# Patient Record
Sex: FEMALE | Race: WHITE | NOT HISPANIC OR LATINO | Employment: OTHER
[De-identification: names, ages, dates, MRNs, and addresses within clinical notes are randomized per-mention and may not be internally consistent; named-entity substitution may affect disease eponyms.]

---

## 2017-06-03 ENCOUNTER — HEALTH MAINTENANCE LETTER (OUTPATIENT)
Age: 57
End: 2017-06-03

## 2019-09-29 ENCOUNTER — HEALTH MAINTENANCE LETTER (OUTPATIENT)
Age: 59
End: 2019-09-29

## 2020-11-13 ENCOUNTER — TELEPHONE (OUTPATIENT)
Dept: OBGYN | Facility: CLINIC | Age: 60
End: 2020-11-13

## 2020-11-13 NOTE — TELEPHONE ENCOUNTER
Pt calling for uro gyn who ifunctions at the clinic Dr. Escalera and Dr. Dorado.  She will check with her insurance and call back to [phone] to joel an appt

## 2021-01-14 ENCOUNTER — HEALTH MAINTENANCE LETTER (OUTPATIENT)
Age: 61
End: 2021-01-14

## 2021-01-14 ENCOUNTER — HOSPITAL ENCOUNTER (OUTPATIENT)
Facility: CLINIC | Age: 61
End: 2021-01-14
Attending: OBSTETRICS & GYNECOLOGY | Admitting: OBSTETRICS & GYNECOLOGY
Payer: COMMERCIAL

## 2021-03-01 ENCOUNTER — DOCUMENTATION ONLY (OUTPATIENT)
Dept: CARE COORDINATION | Facility: CLINIC | Age: 61
End: 2021-03-01

## 2021-03-28 ASSESSMENT — ENCOUNTER SYMPTOMS
POLYDIPSIA: 0
EYE IRRITATION: 0
FEVER: 0
WEIGHT GAIN: 1
STIFFNESS: 1
NECK PAIN: 0
INCREASED ENERGY: 0
FATIGUE: 0
JOINT SWELLING: 1
BACK PAIN: 0
MYALGIAS: 1
NIGHT SWEATS: 0
ARTHRALGIAS: 1
ALTERED TEMPERATURE REGULATION: 0
DOUBLE VISION: 0
EYE PAIN: 0
MUSCLE CRAMPS: 0
HALLUCINATIONS: 0
WEIGHT LOSS: 0
CHILLS: 0
EYE WATERING: 0
EYE REDNESS: 1
POLYPHAGIA: 0
MUSCLE WEAKNESS: 0
DECREASED APPETITE: 0

## 2021-03-31 ENCOUNTER — OFFICE VISIT (OUTPATIENT)
Dept: INTERNAL MEDICINE | Facility: CLINIC | Age: 61
End: 2021-03-31
Payer: COMMERCIAL

## 2021-03-31 VITALS
WEIGHT: 198 LBS | OXYGEN SATURATION: 100 % | DIASTOLIC BLOOD PRESSURE: 81 MMHG | HEART RATE: 66 BPM | BODY MASS INDEX: 31.01 KG/M2 | SYSTOLIC BLOOD PRESSURE: 128 MMHG

## 2021-03-31 DIAGNOSIS — Z86.0101 HX OF ADENOMATOUS COLONIC POLYPS: Primary | ICD-10-CM

## 2021-03-31 DIAGNOSIS — H91.90 DECREASED HEARING, UNSPECIFIED LATERALITY: ICD-10-CM

## 2021-03-31 DIAGNOSIS — Z12.31 ENCOUNTER FOR SCREENING MAMMOGRAM FOR BREAST CANCER: ICD-10-CM

## 2021-03-31 DIAGNOSIS — Z86.2 HISTORY OF ANEMIA: ICD-10-CM

## 2021-03-31 DIAGNOSIS — Z00.00 ROUTINE HISTORY AND PHYSICAL EXAMINATION OF ADULT: ICD-10-CM

## 2021-03-31 DIAGNOSIS — Z78.0 MENOPAUSE: ICD-10-CM

## 2021-03-31 DIAGNOSIS — G25.81 RESTLESS LEG SYNDROME: ICD-10-CM

## 2021-03-31 DIAGNOSIS — B00.1 COLD SORE: ICD-10-CM

## 2021-03-31 LAB
ALBUMIN SERPL-MCNC: 4 G/DL (ref 3.4–5)
ALP SERPL-CCNC: 105 U/L (ref 40–150)
ALT SERPL W P-5'-P-CCNC: 27 U/L (ref 0–50)
ANION GAP SERPL CALCULATED.3IONS-SCNC: 2 MMOL/L (ref 3–14)
AST SERPL W P-5'-P-CCNC: 23 U/L (ref 0–45)
BASOPHILS # BLD AUTO: 0 10E9/L (ref 0–0.2)
BASOPHILS NFR BLD AUTO: 0.7 %
BILIRUB SERPL-MCNC: 0.3 MG/DL (ref 0.2–1.3)
BUN SERPL-MCNC: 11 MG/DL (ref 7–30)
CALCIUM SERPL-MCNC: 9.3 MG/DL (ref 8.5–10.1)
CHLORIDE SERPL-SCNC: 108 MMOL/L (ref 94–109)
CHOLEST SERPL-MCNC: 192 MG/DL
CO2 SERPL-SCNC: 31 MMOL/L (ref 20–32)
CREAT SERPL-MCNC: 0.95 MG/DL (ref 0.52–1.04)
DEPRECATED CALCIDIOL+CALCIFEROL SERPL-MC: 36 UG/L (ref 20–75)
DIFFERENTIAL METHOD BLD: NORMAL
EOSINOPHIL # BLD AUTO: 0.3 10E9/L (ref 0–0.7)
EOSINOPHIL NFR BLD AUTO: 4.5 %
ERYTHROCYTE [DISTWIDTH] IN BLOOD BY AUTOMATED COUNT: 12.3 % (ref 10–15)
FERRITIN SERPL-MCNC: 33 NG/ML (ref 8–252)
GFR SERPL CREATININE-BSD FRML MDRD: 65 ML/MIN/{1.73_M2}
GLUCOSE SERPL-MCNC: 88 MG/DL (ref 70–99)
HCT VFR BLD AUTO: 43.1 % (ref 35–47)
HDLC SERPL-MCNC: 73 MG/DL
HGB BLD-MCNC: 14 G/DL (ref 11.7–15.7)
IMM GRANULOCYTES # BLD: 0 10E9/L (ref 0–0.4)
IMM GRANULOCYTES NFR BLD: 0 %
IRON SATN MFR SERPL: 12 % (ref 15–46)
IRON SERPL-MCNC: 38 UG/DL (ref 35–180)
LDLC SERPL CALC-MCNC: 105 MG/DL
LYMPHOCYTES # BLD AUTO: 2.6 10E9/L (ref 0.8–5.3)
LYMPHOCYTES NFR BLD AUTO: 47.5 %
MCH RBC QN AUTO: 31.9 PG (ref 26.5–33)
MCHC RBC AUTO-ENTMCNC: 32.5 G/DL (ref 31.5–36.5)
MCV RBC AUTO: 98 FL (ref 78–100)
MONOCYTES # BLD AUTO: 0.4 10E9/L (ref 0–1.3)
MONOCYTES NFR BLD AUTO: 7.3 %
NEUTROPHILS # BLD AUTO: 2.2 10E9/L (ref 1.6–8.3)
NEUTROPHILS NFR BLD AUTO: 40 %
NONHDLC SERPL-MCNC: 120 MG/DL
NRBC # BLD AUTO: 0 10*3/UL
NRBC BLD AUTO-RTO: 0 /100
PLATELET # BLD AUTO: 240 10E9/L (ref 150–450)
POTASSIUM SERPL-SCNC: 3.8 MMOL/L (ref 3.4–5.3)
PROT SERPL-MCNC: 7 G/DL (ref 6.8–8.8)
RBC # BLD AUTO: 4.39 10E12/L (ref 3.8–5.2)
SODIUM SERPL-SCNC: 141 MMOL/L (ref 133–144)
TIBC SERPL-MCNC: 323 UG/DL (ref 240–430)
TRIGL SERPL-MCNC: 75 MG/DL
TSH SERPL DL<=0.005 MIU/L-ACNC: 3.8 MU/L (ref 0.4–4)
WBC # BLD AUTO: 5.5 10E9/L (ref 4–11)

## 2021-03-31 PROCEDURE — 83540 ASSAY OF IRON: CPT | Performed by: PATHOLOGY

## 2021-03-31 PROCEDURE — 99000 SPECIMEN HANDLING OFFICE-LAB: CPT | Performed by: PATHOLOGY

## 2021-03-31 PROCEDURE — 82306 VITAMIN D 25 HYDROXY: CPT | Mod: 90 | Performed by: PATHOLOGY

## 2021-03-31 PROCEDURE — 82728 ASSAY OF FERRITIN: CPT | Performed by: PATHOLOGY

## 2021-03-31 PROCEDURE — 99386 PREV VISIT NEW AGE 40-64: CPT | Performed by: INTERNAL MEDICINE

## 2021-03-31 PROCEDURE — 83550 IRON BINDING TEST: CPT | Performed by: PATHOLOGY

## 2021-03-31 PROCEDURE — 80061 LIPID PANEL: CPT | Performed by: PATHOLOGY

## 2021-03-31 PROCEDURE — 80050 GENERAL HEALTH PANEL: CPT | Performed by: PATHOLOGY

## 2021-03-31 PROCEDURE — 36415 COLL VENOUS BLD VENIPUNCTURE: CPT | Performed by: PATHOLOGY

## 2021-03-31 RX ORDER — VITAMIN B COMPLEX
TABLET ORAL DAILY
COMMUNITY
End: 2021-10-07

## 2021-03-31 RX ORDER — VALACYCLOVIR HYDROCHLORIDE 1 G/1
2000 TABLET, FILM COATED ORAL 2 TIMES DAILY
Qty: 4 TABLET | Refills: 4 | Status: SHIPPED | OUTPATIENT
Start: 2021-03-31 | End: 2021-10-19

## 2021-03-31 RX ORDER — GABAPENTIN 300 MG/1
300 CAPSULE ORAL AT BEDTIME
Qty: 90 CAPSULE | Refills: 3 | Status: SHIPPED | OUTPATIENT
Start: 2021-03-31 | End: 2021-10-07

## 2021-03-31 RX ORDER — CHLORAL HYDRATE 500 MG
2 CAPSULE ORAL DAILY
COMMUNITY
End: 2021-10-07

## 2021-03-31 NOTE — PROGRESS NOTES
Jessie is a very pleasant 61 year old with a PMH of uterine prolapse and knee meniscal repair who presents to establish primary care and routine physical.  She is generally quite healthy but hasn't been seen since 2018 and so is due for a few screening tests.  Also reports restless legs and an occasional cold sore outbreak, used to see Derm for a valtrex prescription.  Also notes reduced hearing (her family comments on it) with some occasional tinnitus.    Current Outpatient Medications:      albuterol (PROAIR HFA, PROVENTIL HFA, VENTOLIN HFA) 108 (90 BASE) MCG/ACT inhaler, Inhale 2 puffs into the lungs every 6 hours as needed for shortness of breath / dyspnea or wheezing, Disp: 1 Inhaler, Rfl: 1     GLUCOSAMINE SULFATE PO, Take 500 mg by mouth 3 times daily, Disp: , Rfl:      Ibuprofen (ADVIL PO), Take 600 mg by mouth as needed for moderate pain, Disp: , Rfl:      methylPREDNISolone (MEDROL DOSEPAK) 4 MG tablet, Follow package instructions, Disp: 21 tablet, Rfl: 0     Naproxen Sodium (ALEVE PO), , Disp: , Rfl:     No changes to past, family, or social history and  ROS: 10 point ROS neg other than the symptoms noted above in the HPI.    PHYSICAL EXAM:  /81   Pulse 66   Wt 89.8 kg (198 lb)   SpO2 100%   Breastfeeding No   BMI 31.01 kg/m    Constitutional: no distress, comfortable, pleasant   Eyes: anicteric, normal extra-ocular movements   Ears, Nose and Throat: tympanic membranes clear, neck supple with full range of motion, no thyromegaly.   Cardiovascular: regular rate and rhythm, normal S1 and S2, no murmurs, rubs or gallops, peripheral pulses full and symmetric   Respiratory: clear to auscultation, no wheezes or crackles, normal breath sounds   Gastrointestinal: positive bowel sounds, nontender, no hepatosplenomegaly, no masses   Musculoskeletal: right knee full range of motion, no effusion; left has brace in place  Skin: no concerning lesions, no jaundice   Neurological: normal gait, no tremor    Psychological: appropriate mood   Lymphatic: no cervical lymphadenopathy and no pedal edema    A/P Sonja was seen today for establish care and physical    Hx of adenomatous colonic polyps  -     GASTROENTEROLOGY ADULT REF PROCEDURE ONLY; Future    Routine history and physical examination of adult  -     Lipid Profile NON-FASTING; Future  -     Comprehensive metabolic panel; Future  -     TSH with free T4 reflex; Future  -     Vitamin D Deficiency; Future    History of anemia  -     Ferritin; Future  -     Iron and iron binding capacity; Future  -     CBC with platelets differential; Future    Restless leg syndrome  -     gabapentin (NEURONTIN) 300 MG capsule; Take 1 capsule (300 mg) by mouth At Bedtime    Menopause  -     Dexa hip/pelvis/spine*; Future    Cold sore  -     valACYclovir (VALTREX) 1000 mg tablet; Take 2 tablets (2,000 mg) by mouth 2 times daily for 5 days    Encounter for screening mammogram for breast cancer  -     Mammogram, screening w landry (3D); Future    Decreased hearing, unspecified laterality  -     AUDIOLOGY ADULT REFERRAL; Future    RTC in one year or sooner prn.  Danyell Pearson MD

## 2021-03-31 NOTE — NURSING NOTE
Chief Complaint   Patient presents with     Establish Care     Kimberly Nissen, REMY at 12:33 PM on 3/31/2021

## 2021-03-31 NOTE — PATIENT INSTRUCTIONS
Radiology:  [phone] [facility]  [phone] [facility]  [phone] [facility]    To schedule a mammogram:  [facility] [phone]   [facility] [phone]   [facility] [phone]

## 2021-04-01 ENCOUNTER — TELEPHONE (OUTPATIENT)
Dept: AUDIOLOGY | Facility: CLINIC | Age: 61
End: 2021-04-01

## 2021-04-01 NOTE — TELEPHONE ENCOUNTER
LVM for patient to schedule HEV with next available audiologist per referral from Dr. Martín Pearson.    Left call center number for scheduling.

## 2021-09-17 ENCOUNTER — TELEPHONE (OUTPATIENT)
Dept: GASTROENTEROLOGY | Facility: CLINIC | Age: 61
End: 2021-09-17

## 2021-09-17 NOTE — TELEPHONE ENCOUNTER
Screening Questions  1. Are you active on mychart? YES    2. What insurance is in the chart? [employer]    2.  Ordering/Referring Provider: Danyell Bateman MD    3. BMI 30.7    4. Are you on daily home oxygen? NO    5. Do you have a history of difficult airway? NO    6. Have you had a heart, lung, or liver transplant? NO    7. Are you currently on dialysis or have chronic kidney disease? NO    8. Have you had a stroke or Transient ischemic atttack (TIA) within 6 months? NO    9. In the past 6 months, have you had any heart related issues including cardiomyopathy or heart attack?         If yes, did it require cardiac stenting or other implantable device?NO    10. Do you have any implantable devices in your body (pacemaker, defib, LVAD)? NO    11. Do you take nitroglycerin? If yes, how often? NO    12. Are you currently taking any blood thinners?NO    13. Are you a diabetic? NO    14. (Females) Are you currently pregnant? NO  If yes, how many weeks?    15. Have you had a procedure in the past that was difficult to tolerate with conscious sedation? Any allergies to Fentanyl or Versed NO    16. Are you taking any scheduled prescription narcotics more than once daily? NO    17. Do you have any chemical dependencies such as alcohol, street drugs, or methadone? NO    18. Do you have any history of post-traumatic stress syndrome or mental health issues? NO    19. Do you transfer independently? YES    20.  Do you have any issues with constipation? NO    21. Preferred Pharmacy for Pre Prescription [facility]     Scheduling Details    Which Colonoscopy Prep was Sent?: MIRALAX  Procedure Scheduled: COLONOSCOPY   Provider/Surgeon: Dr. Morales  Date of Procedure: 09/24/2021  Location: [facility]  Caller (Please ask for phone number if not scheduled by patient): DANIELLE      Sedation Type: CS  Conscious Sedation- Needs  for 6 hours after the procedure  MAC/General-Needs  for 24 hours after procedure    Pre-op  Required at [facility] and OR for MAC sedation:   (if yes advise patient they will need a pre-op prior to procedure)      Is patient on blood thinners? -NO  (If yes- inform patient to follow up with PCP or provider for follow up instructions)     Informed patient they will need an adult  YES  Cannot take any type of public or medical transportation alone    Pre-Procedure [facility] or Externally: YES    Confirmed Nurse will call to complete assessment YES    Additional comments: NO

## 2021-09-17 NOTE — TELEPHONE ENCOUNTER
Pre assessment questions completed for upcoming colonoscopy procedure scheduled on 9/24/21    COVID test scheduled? Patient to call to schedule. Patient is aware that test is required within 4 days of procedure.     Procedural arrival time and facility location reviewed.    Designated  policy reviewed. Instructed to have someone stay 6 hours post procedure.     Bowel prep recommendation: Miralax/Magnesium citrate/Dulcolax     Reviewed Miralax prep instructions with patient. No fiber/iron supplements or foods that contain nuts/seeds 7 days prior to procedure.     Anticoagulation/blood thinners? no    Electronic implanted devices? no    Patient verbalized understanding and had no questions or concerns at this time.    Valentina Figueroa RN

## 2021-09-19 DIAGNOSIS — Z11.59 ENCOUNTER FOR SCREENING FOR OTHER VIRAL DISEASES: ICD-10-CM

## 2021-09-20 ENCOUNTER — TELEPHONE (OUTPATIENT)
Dept: GASTROENTEROLOGY | Facility: OUTPATIENT CENTER | Age: 61
End: 2021-09-20

## 2021-09-20 DIAGNOSIS — Z11.59 ENCOUNTER FOR SCREENING FOR OTHER VIRAL DISEASES: ICD-10-CM

## 2021-09-20 NOTE — TELEPHONE ENCOUNTER
Caller: SELF    Procedure: COLONOSCOPY    Date of Procedure Cancelled: 9-24    Ordering Provider: PCP    Reason for cancel: PATIENT PREFERS A GI DOC TO PERFORM PROCEDURE ONLY    Any Staff messages sent regarding case?: NO                     Rescheduled: YES     If rescheduled:    Date: 9-28   Location: [facility]   Note any change or update to original order/sedation: DIFFERENT DOC, DUE TO PATIENT PREFERENCE

## 2021-09-24 ENCOUNTER — LAB (OUTPATIENT)
Dept: URGENT CARE | Facility: URGENT CARE | Age: 61
End: 2021-09-24
Payer: COMMERCIAL

## 2021-09-24 DIAGNOSIS — Z11.59 ENCOUNTER FOR SCREENING FOR OTHER VIRAL DISEASES: ICD-10-CM

## 2021-09-24 PROCEDURE — U0005 INFEC AGEN DETEC AMPLI PROBE: HCPCS

## 2021-09-24 PROCEDURE — U0003 INFECTIOUS AGENT DETECTION BY NUCLEIC ACID (DNA OR RNA); SEVERE ACUTE RESPIRATORY SYNDROME CORONAVIRUS 2 (SARS-COV-2) (CORONAVIRUS DISEASE [COVID-19]), AMPLIFIED PROBE TECHNIQUE, MAKING USE OF HIGH THROUGHPUT TECHNOLOGIES AS DESCRIBED BY CMS-2020-01-R: HCPCS

## 2021-09-24 PROCEDURE — 99207 PR NO CHARGE LOS: CPT

## 2021-09-25 LAB — SARS-COV-2 RNA RESP QL NAA+PROBE: NEGATIVE

## 2021-09-28 ENCOUNTER — HOSPITAL ENCOUNTER (OUTPATIENT)
Facility: CLINIC | Age: 61
Discharge: HOME | End: 2021-09-28
Attending: INTERNAL MEDICINE | Admitting: INTERNAL MEDICINE
Payer: COMMERCIAL

## 2021-09-28 VITALS
WEIGHT: 202.16 LBS | RESPIRATION RATE: 16 BRPM | HEIGHT: 66 IN | DIASTOLIC BLOOD PRESSURE: 80 MMHG | BODY MASS INDEX: 32.49 KG/M2 | SYSTOLIC BLOOD PRESSURE: 127 MMHG | HEART RATE: 69 BPM | TEMPERATURE: 98.3 F | OXYGEN SATURATION: 98 %

## 2021-09-28 DIAGNOSIS — Z11.59 ENCOUNTER FOR SCREENING FOR OTHER VIRAL DISEASES: ICD-10-CM

## 2021-09-28 LAB — COLONOSCOPY: NORMAL

## 2021-09-28 PROCEDURE — 88305 TISSUE EXAM BY PATHOLOGIST: CPT | Mod: TC | Performed by: INTERNAL MEDICINE

## 2021-09-28 PROCEDURE — G0500 MOD SEDAT ENDO SERVICE >5YRS: HCPCS | Performed by: INTERNAL MEDICINE

## 2021-09-28 PROCEDURE — 88305 TISSUE EXAM BY PATHOLOGIST: CPT | Mod: 26 | Performed by: PATHOLOGY

## 2021-09-28 PROCEDURE — 250N000011 HC RX IP 250 OP 636: Performed by: INTERNAL MEDICINE

## 2021-09-28 PROCEDURE — 250N000013 HC RX MED GY IP 250 OP 250 PS 637: Performed by: INTERNAL MEDICINE

## 2021-09-28 PROCEDURE — 45385 COLONOSCOPY W/LESION REMOVAL: CPT | Performed by: INTERNAL MEDICINE

## 2021-09-28 RX ORDER — NALOXONE HYDROCHLORIDE 0.4 MG/ML
0.2 INJECTION, SOLUTION INTRAMUSCULAR; INTRAVENOUS; SUBCUTANEOUS
Status: DISCONTINUED | OUTPATIENT
Start: 2021-09-28 | End: 2021-09-28 | Stop reason: HOSPADM

## 2021-09-28 RX ORDER — ONDANSETRON 2 MG/ML
4 INJECTION INTRAMUSCULAR; INTRAVENOUS
Status: DISCONTINUED | OUTPATIENT
Start: 2021-09-28 | End: 2021-09-28 | Stop reason: HOSPADM

## 2021-09-28 RX ORDER — ONDANSETRON 2 MG/ML
4 INJECTION INTRAMUSCULAR; INTRAVENOUS EVERY 6 HOURS PRN
Status: DISCONTINUED | OUTPATIENT
Start: 2021-09-28 | End: 2021-09-28 | Stop reason: HOSPADM

## 2021-09-28 RX ORDER — LIDOCAINE 40 MG/G
CREAM TOPICAL
Status: DISCONTINUED | OUTPATIENT
Start: 2021-09-28 | End: 2021-09-28 | Stop reason: HOSPADM

## 2021-09-28 RX ORDER — ONDANSETRON 4 MG/1
4 TABLET, ORALLY DISINTEGRATING ORAL EVERY 6 HOURS PRN
Status: DISCONTINUED | OUTPATIENT
Start: 2021-09-28 | End: 2021-09-28 | Stop reason: HOSPADM

## 2021-09-28 RX ORDER — NALOXONE HYDROCHLORIDE 0.4 MG/ML
0.4 INJECTION, SOLUTION INTRAMUSCULAR; INTRAVENOUS; SUBCUTANEOUS
Status: DISCONTINUED | OUTPATIENT
Start: 2021-09-28 | End: 2021-09-28 | Stop reason: HOSPADM

## 2021-09-28 RX ORDER — FENTANYL CITRATE 50 UG/ML
INJECTION, SOLUTION INTRAMUSCULAR; INTRAVENOUS PRN
Status: COMPLETED | OUTPATIENT
Start: 2021-09-28 | End: 2021-09-28

## 2021-09-28 RX ORDER — VALACYCLOVIR HYDROCHLORIDE 1 G/1
2000 TABLET, FILM COATED ORAL
COMMUNITY
Start: 2021-03-31 | End: 2021-10-07

## 2021-09-28 RX ORDER — PROCHLORPERAZINE MALEATE 10 MG
10 TABLET ORAL EVERY 6 HOURS PRN
Status: DISCONTINUED | OUTPATIENT
Start: 2021-09-28 | End: 2021-09-28 | Stop reason: HOSPADM

## 2021-09-28 RX ORDER — FLUMAZENIL 0.1 MG/ML
0.2 INJECTION, SOLUTION INTRAVENOUS
Status: DISCONTINUED | OUTPATIENT
Start: 2021-09-28 | End: 2021-09-28 | Stop reason: HOSPADM

## 2021-09-28 RX ORDER — CELECOXIB 200 MG/1
CAPSULE ORAL
COMMUNITY
Start: 2021-09-16 | End: 2021-10-07

## 2021-09-28 RX ORDER — SIMETHICONE 40MG/0.6ML
SUSPENSION, DROPS(FINAL DOSAGE FORM)(ML) ORAL PRN
Status: COMPLETED | OUTPATIENT
Start: 2021-09-28 | End: 2021-09-28

## 2021-09-28 RX ORDER — DIPHENHYDRAMINE HYDROCHLORIDE 50 MG/ML
INJECTION INTRAMUSCULAR; INTRAVENOUS PRN
Status: COMPLETED | OUTPATIENT
Start: 2021-09-28 | End: 2021-09-28

## 2021-09-28 RX ADMIN — DIPHENHYDRAMINE HYDROCHLORIDE 25 MG: 50 INJECTION, SOLUTION INTRAMUSCULAR; INTRAVENOUS at 09:30

## 2021-09-28 RX ADMIN — DIPHENHYDRAMINE HYDROCHLORIDE 25 MG: 50 INJECTION, SOLUTION INTRAMUSCULAR; INTRAVENOUS at 09:27

## 2021-09-28 RX ADMIN — FENTANYL CITRATE 50 MCG: 50 INJECTION, SOLUTION INTRAMUSCULAR; INTRAVENOUS at 09:27

## 2021-09-28 RX ADMIN — SIMETHICONE 133 MG: 63.3; 3.7 SOLUTION/ DROPS ORAL at 09:22

## 2021-09-28 RX ADMIN — MIDAZOLAM 1 MG: 1 INJECTION INTRAMUSCULAR; INTRAVENOUS at 09:32

## 2021-09-28 RX ADMIN — FENTANYL CITRATE 100 MCG: 50 INJECTION, SOLUTION INTRAMUSCULAR; INTRAVENOUS at 09:24

## 2021-09-28 RX ADMIN — MIDAZOLAM 2 MG: 1 INJECTION INTRAMUSCULAR; INTRAVENOUS at 09:23

## 2021-09-28 RX ADMIN — MIDAZOLAM 1 MG: 1 INJECTION INTRAMUSCULAR; INTRAVENOUS at 09:26

## 2021-09-28 ASSESSMENT — MIFFLIN-ST. JEOR: SCORE: 1508.75

## 2021-09-28 NOTE — H&P
Sojna Morfin  [id number]  female  61 year old      Reason for procedure/surgery: colon polyp surveillance    There is no problem list on file for this patient.      Past Surgical History:    Past Surgical History:   Procedure Laterality Date     TONSILLECTOMY         Past Medical History:   Past Medical History:   Diagnosis Date     NO ACTIVE PROBLEMS        Social History:   Social History     Tobacco Use     Smoking status: Never Smoker   Substance Use Topics     Alcohol use: Not on file       Family History:   Family History   Problem Relation Age of Onset     Hyperlipidemia Mother      Hyperlipidemia Father      Cerebrovascular Disease Paternal Grandfather        Allergies: No Known Allergies    Active Medications:   No current outpatient medications on file.       Systemic Review:   CONSTITUTIONAL: NEGATIVE for fever, chills, change in weight  ENT/MOUTH: NEGATIVE for ear, mouth and throat problems  RESP: NEGATIVE for significant cough or SOB  CV: NEGATIVE for chest pain, palpitations or peripheral edema    Physical Examination:   Vital Signs: There were no vitals taken for this visit.  GENERAL: healthy, alert and no distress  NECK: no adenopathy, no asymmetry, masses, or scars  RESP: lungs clear to auscultation - no rales, rhonchi or wheezes  CV: regular rate and rhythm, normal S1 S2, no S3 or S4, no murmur, click or rub, no peripheral edema and peripheral pulses strong  ABDOMEN: soft, nontender, no hepatosplenomegaly, no masses and bowel sounds normal  MS: no gross musculoskeletal defects noted, no edema    Plan: Appropriate to proceed as scheduled.      Mario Wilkes MD  9/28/2021    PCP:  Danyell Bateman

## 2021-09-28 NOTE — DISCHARGE INSTRUCTIONS
Discharge Instructions after Colonoscopy with polypectomy by Dr Wilkes    Activity and Diet  You were given medicine for pain. You may be dizzy or sleepy.  For 24 hours:    Do not drive or use heavy equipment.    Do not make important decisions.    Do not drink any alcohol.  You may return to your normal diet and medicines.    Discomfort    Air was placed in your colon during the exam in order to see it. Walking helps to pass the air.    You may take Tylenol (acetaminophen) for pain unless your doctor has told you not to.    Follow-up  _X___ We took small polyps to study. Your doctor will send you the results  within two weeks.    When to call:    Call right away if you have:    Unusual pain in belly or chest pain not relieved with passing air.    More than 1 to 2 Tablespoons of bleeding from your rectum.    Fever above 100.6  F (37.5  C).    If you have severe pain, bleeding, or shortness of breath, go to an emergency room.    If you have questions, call:  Monday to Friday, 8 a.m. to 4:30 p.m.  Central Scheduling Department: [phone]    After hours  Hospital: [phone] (Ask for the GI fellow on call)

## 2021-09-28 NOTE — OR NURSING
Procedure: Colonoscopy with polypectomy x2 with cold snare  Sedation: Conscious sedation   Specimens: x1 jar to path  O2: 2L NC    Patient tolerated procedure well. Patient stable on transfer to .

## 2021-09-29 LAB
PATH REPORT.COMMENTS IMP SPEC: NORMAL
PATH REPORT.COMMENTS IMP SPEC: NORMAL
PATH REPORT.FINAL DX SPEC: NORMAL
PATH REPORT.GROSS SPEC: NORMAL
PATH REPORT.MICROSCOPIC SPEC OTHER STN: NORMAL
PATH REPORT.RELEVANT HX SPEC: NORMAL
PHOTO IMAGE: NORMAL

## 2021-10-07 ENCOUNTER — OFFICE VISIT (OUTPATIENT)
Dept: FAMILY MEDICINE | Facility: CLINIC | Age: 61
End: 2021-10-07
Payer: COMMERCIAL

## 2021-10-07 VITALS
WEIGHT: 199 LBS | BODY MASS INDEX: 32.12 KG/M2 | DIASTOLIC BLOOD PRESSURE: 78 MMHG | HEART RATE: 61 BPM | TEMPERATURE: 97.5 F | OXYGEN SATURATION: 98 % | SYSTOLIC BLOOD PRESSURE: 122 MMHG

## 2021-10-07 DIAGNOSIS — N81.4 UTERINE PROLAPSE: ICD-10-CM

## 2021-10-07 DIAGNOSIS — N39.3 STRESS INCONTINENCE IN FEMALE: ICD-10-CM

## 2021-10-07 DIAGNOSIS — R00.1 SINUS BRADYCARDIA: ICD-10-CM

## 2021-10-07 DIAGNOSIS — Z11.4 SCREENING FOR HIV (HUMAN IMMUNODEFICIENCY VIRUS): ICD-10-CM

## 2021-10-07 DIAGNOSIS — Z11.59 NEED FOR HEPATITIS C SCREENING TEST: ICD-10-CM

## 2021-10-07 DIAGNOSIS — Z01.818 PREOPERATIVE CLEARANCE: Primary | ICD-10-CM

## 2021-10-07 PROBLEM — M54.6 PAIN IN THORACIC SPINE: Status: ACTIVE | Noted: 2021-10-07

## 2021-10-07 PROBLEM — F43.29 ADJUSTMENT DISORDER WITH MIXED EMOTIONAL FEATURES: Status: ACTIVE | Noted: 2021-10-07

## 2021-10-07 PROBLEM — R07.89 ATYPICAL CHEST PAIN: Status: ACTIVE | Noted: 2021-10-07

## 2021-10-07 PROBLEM — S83.272A COMPLEX TEAR OF LATERAL MENISCUS OF LEFT KNEE AS CURRENT INJURY: Status: ACTIVE | Noted: 2021-01-18

## 2021-10-07 PROBLEM — L57.0 ACTINIC KERATOSIS: Status: ACTIVE | Noted: 2021-10-07

## 2021-10-07 PROBLEM — Z78.0 MENOPAUSE PRESENT: Status: ACTIVE | Noted: 2018-12-24

## 2021-10-07 PROBLEM — M17.12 PRIMARY OSTEOARTHRITIS OF LEFT KNEE: Status: ACTIVE | Noted: 2021-01-18

## 2021-10-07 PROBLEM — B00.1 HERPES SIMPLEX LABIALIS: Status: ACTIVE | Noted: 2021-10-07

## 2021-10-07 PROBLEM — G25.81 RESTLESS LEG SYNDROME: Status: ACTIVE | Noted: 2021-10-07

## 2021-10-07 LAB
ERYTHROCYTE [DISTWIDTH] IN BLOOD BY AUTOMATED COUNT: 13.2 % (ref 10–15)
HCT VFR BLD AUTO: 44.7 % (ref 35–47)
HGB BLD-MCNC: 14.6 G/DL (ref 11.7–15.7)
MCH RBC QN AUTO: 32.1 PG (ref 26.5–33)
MCHC RBC AUTO-ENTMCNC: 32.7 G/DL (ref 31.5–36.5)
MCV RBC AUTO: 98 FL (ref 78–100)
PLATELET # BLD AUTO: 228 10E3/UL (ref 150–450)
RBC # BLD AUTO: 4.55 10E6/UL (ref 3.8–5.2)
WBC # BLD AUTO: 5.9 10E3/UL (ref 4–11)

## 2021-10-07 PROCEDURE — 93000 ELECTROCARDIOGRAM COMPLETE: CPT | Performed by: INTERNAL MEDICINE

## 2021-10-07 PROCEDURE — 80053 COMPREHEN METABOLIC PANEL: CPT | Performed by: INTERNAL MEDICINE

## 2021-10-07 PROCEDURE — 99214 OFFICE O/P EST MOD 30 MIN: CPT | Performed by: INTERNAL MEDICINE

## 2021-10-07 PROCEDURE — 85027 COMPLETE CBC AUTOMATED: CPT | Performed by: INTERNAL MEDICINE

## 2021-10-07 PROCEDURE — 86803 HEPATITIS C AB TEST: CPT | Performed by: INTERNAL MEDICINE

## 2021-10-07 PROCEDURE — 36415 COLL VENOUS BLD VENIPUNCTURE: CPT | Performed by: INTERNAL MEDICINE

## 2021-10-07 PROCEDURE — 87389 HIV-1 AG W/HIV-1&-2 AB AG IA: CPT | Performed by: INTERNAL MEDICINE

## 2021-10-07 RX ORDER — ESTRADIOL 0.1 MG/G
1 CREAM VAGINAL DAILY
COMMUNITY
Start: 2021-01-14 | End: 2022-01-27

## 2021-10-07 ASSESSMENT — PAIN SCALES - GENERAL: PAINLEVEL: NO PAIN (0)

## 2021-10-07 NOTE — PATIENT INSTRUCTIONS
As discussed, please do the lab work for your preoperative clearance .       ===================      Preparing for Your Surgery  Getting started  A nurse will call you to review your health history and instructions. They will give you an arrival time based on your scheduled surgery time.  Please be ready to share the following:    Your doctor's clinic name and phone number    Your medical, surgical and anesthesia history    A list of allergies and sensitivities    A list of medicines, including herbal treatments and over-the-counter drugs    Whether the patient has a legal guardian (ask how to send us the papers in advance)  If you have a child who's having surgery, please ask for a copy of Preparing for Your Child's Surgery.    Preparing for surgery    Within 30 days of surgery: Have a pre-op exam (sometimes called an H&P, or History and Physical). This can be done at a clinic or pre-operative center.  ? If you're having a , you may not need this exam. Talk to your care team    At your pre-op exam, talk to your care team about all medicines you take. If you need to stop any medicines before surgery, ask when to start taking them again.  ? We do this for your safety. Many medicines can make you bleed too much during surgery. Some change how well surgery (anesthesia) drugs work.    Call your insurance company to let them know you're having surgery. (If you don't have insurance, call [phone].)    Call your clinic if there's any change in your health. This includes signs of a cold or flu (sore throat, runny nose, cough, rash, fever). It also includes a scrape or scratch near the surgery site.    If you have questions on the day of surgery, call your hospital or surgery center.  Eating and drinking guidelines  For your safety: Unless your surgeon tells you otherwise, follow the guidelines below.    Eat and drink as usual until 8 hours before surgery. After that, no food or milk.    Drink clear liquids  until 2 hours before surgery. These are liquids you can see through, like water, Gatorade and Propel Water. You may also have black coffee and tea (no cream or milk).    Nothing by mouth within 2 hours of surgery. This includes gum, candy and breath mints.    If you drink, stop drinking alcohol the night before surgery.    If your care team tells you to take medicine on the morning of surgery, it's okay to take it with a sip of water.  Preventing infection    Shower or bathe the night before and morning of your surgery. Follow the instructions your clinic gave you. (If no instructions, use regular soap.)    Don't shave or clip hair near your surgery site. We'll remove the hair if needed.    Don't smoke or vape the morning of surgery. You may chew nicotine gum up to 2 hours before surgery. A nicotine patch is okay.  ? Note: Some surgeries require you to completely quit smoking and nicotine. Check with your surgeon.    Your care team will make every effort to keep you safe from infection. We will:  ? Clean our hands often with soap and water (or an alcohol-based hand rub).  ? Clean the skin at your surgery site with a special soap that kills germs.  ? Give you a special gown to keep you warm. (Cold raises the risk of infection.)  ? Wear special hair covers, masks, gowns and gloves during surgery.  ? Give antibiotic medicine, if prescribed. Not all surgeries need antibiotics.  What to bring on the day of surgery    Photo ID and insurance card    Copy of your health care directive, if you have one    Glasses and hearing aides (bring cases)  ? You can't wear contacts during surgery    Inhaler and eye drops, if you use them (tell us about these when you arrive)    CPAP machine or breathing device, if you use them    A few personal items, if spending the night    If you have . . .  ? A pacemaker or ICD (cardiac defibrillator): Bring the ID card.  ? An implanted stimulator: Bring the remote control.  ? A legal guardian:  Bring a copy of the certified (court-stamped) guardianship papers.  Please remove any jewelry, including body piercings. Leave jewelry and other valuables at home.  If you're going home the day of surgery  Important: If you don't follow the rules below, we must cancel your surgery.     Arrange for someone to drive you home after surgery. You may not drive, take a taxi or take public transportation by yourself (unless you'll have local anesthesia only).    Arrange for a responsible adult to stay with you overnight. If you don't, we may keep you in the hospital overnight, and you may need to pay the costs yourself.  Questions?   If you have any questions for your care team, list them here: _________________________________________________________________________________________________________________________________________________________________________________________________________________________________________________________________________________________________________________________  For informational purposes only. Not to replace the advice of your health care provider. Copyright   2003, 2019 [facility]. All rights reserved. Clinically reviewed by Madison Fagan MD. [employer] [id number] - REV 4/20.

## 2021-10-07 NOTE — H&P (VIEW-ONLY)
[address] [address]  Phone: [phone]  Primary Provider: Danyell Bateman  Pre-op Performing Provider: NATI RIVERS      PREOPERATIVE EVALUATION:  Today's date: 10/7/2021    Sonja Morfin is a 61 year old female who presents for a preoperative evaluation.    Surgical Information:  Surgery/Procedure: hysterectomy, bladder repair,   Surgery Location: [facility]   Surgeon: Dr Hassan   Surgery Date: 10/20/2021   Time of Surgery: 11:00 am   Where patient plans to recover: At home with family  Fax number for surgical facility: Note does not need to be faxed, will be available electronically in Epic.    Type of Anesthesia Anticipated: General    Assessment & Plan     The proposed surgical procedure is considered INTERMEDIATE risk.    1. Preoperative clearance  2. Uterine prolapse  3. Stress incontinence in female  Pt is new to me, last seen PCP in 3/2021 for nnual physical. Pt is here for preop for surgery scheduled on 10/20/21 for ROBOTIC ASSISTED SUPRACERVICAL HYSTERECTOMY, SACROCOLPOPEXY & BILATERAL SALPINGECTOMY  under general anaesthesia by  - For pt Uterovaginal prolapse and stress incontinence.  - EKG 12-lead complete w/read - Clinics  - CBC with platelets; Future  - Comprehensive metabolic panel (BMP + Alb, Alk Phos, ALT, AST, Total. Bili, TP); Future  - CBC with platelets  - Comprehensive metabolic panel (BMP + Alb, Alk Phos, ALT, AST, Total. Bili, TP)    4. Sinus bradycardia  EKG positive for Asymptomatic bradycardia.     5. Screening for HIV (human immunodeficiency virus)  - HIV Antigen Antibody Combo; Future  - HIV Antigen Antibody Combo    6. Need for hepatitis C screening test  - Hepatitis C Screen Reflex to HCV RNA Quant and Genotype; Future  - Hepatitis C Screen Reflex to HCV RNA Quant and Genotype      Risks and Recommendations:  The patient has the following additional risks and recommendations for  perioperative complications:   - Consult Hospitalist / IM to assist with post-op medical management    Medication Instructions:  Patient is on no chronic medications    RECOMMENDATION:  APPROVAL GIVEN to proceed with proposed procedure, without further diagnostic evaluation.    Review of external notes as documented above   Independent interpretation of a test performed by another physician/other qualified health care professional (not separately reported) - EKG      30  minutes spent on the date of the encounter doing chart review, review of outside records, review of test results, interpretation of tests, patient visit and documentation         Subjective     HPI related to upcoming procedure:     Preop Questions 10/7/2021   1. Have you ever had a heart attack or stroke? No   2. Have you ever had surgery on your heart or blood vessels, such as a stent placement, a coronary artery bypass, or surgery on an artery in your head, neck, heart, or legs? No   3. Do you have chest pain with activity? No   4. Do you have a history of  heart failure? No   5. Do you currently have a cold, bronchitis or symptoms of other infection? No   6. Do you have a cough, shortness of breath, or wheezing? No   7. Do you or anyone in your family have previous history of blood clots? No   8. Do you or does anyone in your family have a serious bleeding problem such as prolonged bleeding following surgeries or cuts? No   9. Have you ever had problems with anemia or been told to take iron pills? YES    10. Have you had any abnormal blood loss such as black, tarry or bloody stools, or abnormal vaginal bleeding? No   11. Have you ever had a blood transfusion? No   12. Are you willing to have a blood transfusion if it is medically needed before, during, or after your surgery? Yes   13. Have you or any of your relatives ever had problems with anesthesia? No   14. Do you have sleep apnea, excessive snoring or daytime drowsiness? No   15. Do you  have any artifical heart valves or other implanted medical devices like a pacemaker, defibrillator, or continuous glucose monitor? No   16. Do you have artificial joints? No   17. Are you allergic to latex? No   18. Is there any chance that you may be pregnant? No       Health Care Directive:  Patient does not have a Health Care Directive or Living Will: N/A    Preoperative Review of :   reviewed - controlled substances prescribed by other outside provider(s).    Status of Chronic Conditions:  See problem list for active medical problems.  Problems all longstanding and stable, except as noted/documented.  See ROS for pertinent symptoms related to these conditions.      Review of Systems  CONSTITUTIONAL: NEGATIVE for fever, chills, change in weight  INTEGUMENTARY/SKIN: NEGATIVE for worrisome rashes, moles or lesions  EYES: NEGATIVE for vision changes or irritation  ENT/MOUTH: NEGATIVE for ear, mouth and throat problems  RESP: NEGATIVE for significant cough or SOB  CV: NEGATIVE for chest pain, palpitations or peripheral edema  GI: NEGATIVE for nausea, abdominal pain, heartburn, or change in bowel habits  : NEGATIVE for frequency, dysuria, or hematuria  MUSCULOSKELETAL: NEGATIVE for significant arthralgias or myalgia  NEURO: NEGATIVE for weakness, dizziness or paresthesias  ENDOCRINE: NEGATIVE for temperature intolerance, skin/hair changes  HEME: NEGATIVE for bleeding problems  PSYCHIATRIC: NEGATIVE for changes in mood or affect    Patient Active Problem List    Diagnosis Date Noted     Restless leg syndrome 10/07/2021     Priority: Medium     Pain in thoracic spine 10/07/2021     Priority: Medium     Herpes simplex labialis 10/07/2021     Priority: Medium     Atypical chest pain 10/07/2021     Priority: Medium     Adjustment disorder with mixed emotional features 10/07/2021     Priority: Medium     Actinic keratosis 10/07/2021     Priority: Medium     Primary osteoarthritis of left knee 01/18/2021      Priority: Medium     Complex tear of lateral meniscus of left knee as current injury 01/18/2021     Priority: Medium     Menopause present 12/24/2018     Priority: Medium     Uterine prolapse 12/04/2018     Priority: Medium     Stress incontinence in female 12/04/2018     Priority: Medium     Osteoarthrosis 09/24/2015     Priority: Medium     Arthritis of hand 07/30/2015     Priority: Medium      Past Medical History:   Diagnosis Date     NO ACTIVE PROBLEMS      Past Surgical History:   Procedure Laterality Date     TONSILLECTOMY       Current Outpatient Medications   Medication Sig Dispense Refill     estradiol (ESTRACE) 0.1 MG/GM vaginal cream estradiol 0.01% (0.1 mg/gram) vaginal cream   Insert by vaginal route.       Multiple Vitamins-Minerals (MULTIVITAMIN ADULT EXTRA C PO)  (Patient not taking: Reported on 10/7/2021)       valACYclovir (VALTREX) 1000 mg tablet Take 2 tablets (2,000 mg) by mouth 2 times daily for 5 days 4 tablet 4       No Known Allergies     Social History     Tobacco Use     Smoking status: Never Smoker     Smokeless tobacco: Never Used   Substance Use Topics     Alcohol use: Yes     Alcohol/week: 2.0 standard drinks     Types: 2 Glasses of wine per week     Comment: 2x/week     Family History   Problem Relation Age of Onset     Hyperlipidemia Mother      Hyperlipidemia Father      Cerebrovascular Disease Paternal Grandfather      History   Drug Use Unknown         Objective     /78   Pulse 61   Temp 97.5  F (36.4  C) (Tympanic)   Wt 90.3 kg (199 lb)   SpO2 98%   BMI 32.12 kg/m      Physical Exam    GENERAL APPEARANCE: healthy, alert and no distress     EYES: EOMI, PERRL     HENT: ear canals and TM's normal and nose and mouth without ulcers or lesions     NECK: no adenopathy, no asymmetry, masses, or scars and thyroid normal to palpation     RESP: lungs clear to auscultation - no rales, rhonchi or wheezes     CV: regular rates and rhythm, normal S1 S2, no S3 or S4 and no murmur,  click or rub     ABDOMEN:  soft, nontender, no HSM or masses and bowel sounds normal     MS: extremities normal- no gross deformities noted, no evidence of inflammation in joints, FROM in all extremities.     SKIN: no suspicious lesions or rashes     NEURO: Normal strength and tone, sensory exam grossly normal, mentation intact and speech normal     PSYCH: mentation appears normal. and affect normal/bright     LYMPHATICS: No cervical adenopathy    Recent Labs   Lab Test 03/31/21  1419   HGB 14.0         POTASSIUM 3.8   CR 0.95        Diagnostics:  Labs pending at this time.  Results will be reviewed when available.  Recent Results (from the past 720 hour(s))   Asymptomatic COVID-19 Virus (Coronavirus) by PCR Nose    Collection Time: 09/24/21 11:09 AM    Specimen: Nose; Swab   Result Value Ref Range    SARS CoV2 PCR Negative Negative   COLONOSCOPY    Collection Time: 09/28/21  9:04 AM   Result Value Ref Range    COLONOSCOPY       [address] [place] [address] [phone]     Endoscopy Department  _______________________________________________________________________________  Patient Name: Sonja Morfin              Procedure Date: 9/28/2021 9:04 AM  MRN: [id number]                       Account Number: [id number]  YOB: 1960              Admit Type: Outpatient  Age: 61                                Gender: Female  Note Status: Finalized                Attending MD: Maroi Wilkes MD  Total Sedation Time:                    _______________________________________________________________________________     Procedure:           Colonoscopy  Indications:         Surveillance: Personal history of adenomatous polyps on                        last colonoscopy 3 years ago  Providers:           Mario Wilkes MD, Margaux Bear, Jaylene Tena, RN  Referring MD:        Danyell Pearson MD  Requesting Provider: Danyell Pearson,  MD  Medici rios:           Midazolam 4 mg IV, Fentanyl 150 micrograms IV,                        Diphenhydramine 50 mg IV  Complications:       No immediate complications.  _______________________________________________________________________________  Procedure:           Pre-Anesthesia Assessment:                       - See EPIC H and P note                       After obtaining informed consent, the colonoscope was                        passed under direct vision. Throughout the procedure,                        the patient's blood pressure, pulse, and oxygen                        saturations were monitored continuously. The Colonoscope                        was introduced through the anus and advanced to the                        terminal ileum, with identification of the appendiceal                        orifice and IC valve. The colonoscopy was performed                        without difficulty. The patient tolerated the procedure                        well. The quality of the bowel  preparation was evaluated                        using the BBPS ([place] Bowel Preparation Scale) with                        scores of: Right Colon = 3, Transverse Colon = 3 and                        Left Colon = 3 (entire mucosa seen well with no residual                        staining, small fragments of stool or opaque liquid).                        The total BBPS score equals 9.                                                                                   Findings:       Skin tags were found on perianal exam.       The terminal ileum appeared normal.       Two sessile polyps were found in the sigmoid colon. The polyps were 3 to        4 mm in size. These polyps were removed with a cold snare. Resection and        retrieval were complete.       Non-bleeding internal hemorrhoids were found during retroflexion.       The exam was otherwise without abnormality on direct and retroflexion        views.                                                                                     Moderate Sedation:       Moderate (conscious) sedation was administered by the endoscopy nurse        and supervised by the endoscopist. The patient's oxygen saturation,        heart rate, blood pressure and response to care were monitored. Total        physician intraservice time was 21 minutes.  Impression:          - Perianal skin tags found on perianal exam.                       - The examined portion of the ileum was normal.                       - Two 3 to 4 mm polyps in the sigmoid colon, removed                        with a cold snare. Resected and retrieved.                       - Non-bleeding internal hemorrhoids.                       - The examination was otherwise normal on direct and                        retroflexion views.  Recommendation:      - Discharge patient to home (with escort).                       - Resume previous diet.                       - Continue present medications.                       - Await pathology results.                       - Repea t colonoscopy in 7 years for surveillance based                        on pathology results.                       - Return to primary care physician.                                                                                       ____________________  Mario Wilkes MD  9/28/2021 9:52:02 AM  I was physically present for the entire viewing portion of the exam.  __________________________  Signature of teaching physician  [id number] Wilkes MD  Number of Addenda: 0    Note Initiated On: 9/28/2021 9:04 AM  Scope In:  Scope Out:     Surgical Pathology Exam    Collection Time: 09/28/21  9:40 AM   Result Value Ref Range    Case Report       Surgical Pathology Report                         Case: [id number]                                  Authorizing Provider:  Mario Wilkes,  Collected:           09/28/2021 09:40 AM                                 MD     "                                                                       Ordering Location:     [facility]          Received:            09/28/2021 10:30 AM                                 Endoscopy                                                                    Pathologist:           Candelaria Boyd MD                                                   Specimen:    Large Intestine, Colon, Sigmoid, Sigmoid polyps x2                                         Final Diagnosis       A(1). Sigmoid polyps x2:  -Tubular adenoma (1); negative for high-grade dysplasia  -Additional fragment of benign colonic mucosa        Clinical Information       History of adenomatous colonic polyps      Gross Description       A(1). Large Intestine, Colon, Sigmoid, Sigmoid polyps x2:  The specimen is received in formalin with proper patient identification, labeled \"sigmoid polyps x2\".  The specimen consists of white-tan to white-red polypoid fragments measuring 0.6 and 0.7 cm in greatest dimension.  Specimen is wrapped and entirely submitted in cassette A1.          Microscopic Description       Microscopic examination is performed.         Performing Labs       The technical component of this testing was completed at [facility]      Case Images     CBC with platelets    Collection Time: 10/07/21  2:07 PM   Result Value Ref Range    WBC Count 5.9 4.0 - 11.0 10e3/uL    RBC Count 4.55 3.80 - 5.20 10e6/uL    Hemoglobin 14.6 11.7 - 15.7 g/dL    Hematocrit 44.7 35.0 - 47.0 %    MCV 98 78 - 100 fL    MCH 32.1 26.5 - 33.0 pg    MCHC 32.7 31.5 - 36.5 g/dL    RDW 13.2 10.0 - 15.0 %    Platelet Count 228 150 - 450 10e3/uL      EKG required for General anaesthesia , baseline and not completed in the last 90 days.   EKG: sinus bradycardia, normal axis, normal intervals, no acute ST/T changes c/w ischemia, no LVH by voltage criteria, unchanged from previous tracings    Revised " Cardiac Risk Index (RCRI):  The patient has the following serious cardiovascular risks for perioperative complications:   - No serious cardiac risks = 0 points     RCRI Interpretation: 0 points: Class I (very low risk - 0.4% complication rate)           Signed Electronically by: Luisana Nj MD  Copy of this evaluation report is provided to requesting physician.

## 2021-10-07 NOTE — PROGRESS NOTES
[address] [address]  Phone: [phone]  Primary Provider: Danyell Bateman  Pre-op Performing Provider: NATI RIVERS      PREOPERATIVE EVALUATION:  Today's date: 10/7/2021    Sonja Morfin is a 61 year old female who presents for a preoperative evaluation.    Surgical Information:  Surgery/Procedure: hysterectomy, bladder repair,   Surgery Location: [facility]   Surgeon: Dr Hassan   Surgery Date: 10/20/2021   Time of Surgery: 11:00 am   Where patient plans to recover: At home with family  Fax number for surgical facility: Note does not need to be faxed, will be available electronically in Epic.    Type of Anesthesia Anticipated: General    Assessment & Plan     The proposed surgical procedure is considered INTERMEDIATE risk.    1. Preoperative clearance  2. Uterine prolapse  3. Stress incontinence in female  Pt is new to me, last seen PCP in 3/2021 for nnual physical. Pt is here for preop for surgery scheduled on 10/20/21 for ROBOTIC ASSISTED SUPRACERVICAL HYSTERECTOMY, SACROCOLPOPEXY & BILATERAL SALPINGECTOMY  under general anaesthesia by  - For pt Uterovaginal prolapse and stress incontinence.  - EKG 12-lead complete w/read - Clinics  - CBC with platelets; Future  - Comprehensive metabolic panel (BMP + Alb, Alk Phos, ALT, AST, Total. Bili, TP); Future  - CBC with platelets  - Comprehensive metabolic panel (BMP + Alb, Alk Phos, ALT, AST, Total. Bili, TP)    4. Sinus bradycardia  EKG positive for Asymptomatic bradycardia.     5. Screening for HIV (human immunodeficiency virus)  - HIV Antigen Antibody Combo; Future  - HIV Antigen Antibody Combo    6. Need for hepatitis C screening test  - Hepatitis C Screen Reflex to HCV RNA Quant and Genotype; Future  - Hepatitis C Screen Reflex to HCV RNA Quant and Genotype      Risks and Recommendations:  The patient has the following additional risks and recommendations for  perioperative complications:   - Consult Hospitalist / IM to assist with post-op medical management    Medication Instructions:  Patient is on no chronic medications    RECOMMENDATION:  APPROVAL GIVEN to proceed with proposed procedure, without further diagnostic evaluation.    Review of external notes as documented above   Independent interpretation of a test performed by another physician/other qualified health care professional (not separately reported) - EKG      30  minutes spent on the date of the encounter doing chart review, review of outside records, review of test results, interpretation of tests, patient visit and documentation         Subjective     HPI related to upcoming procedure:     Preop Questions 10/7/2021   1. Have you ever had a heart attack or stroke? No   2. Have you ever had surgery on your heart or blood vessels, such as a stent placement, a coronary artery bypass, or surgery on an artery in your head, neck, heart, or legs? No   3. Do you have chest pain with activity? No   4. Do you have a history of  heart failure? No   5. Do you currently have a cold, bronchitis or symptoms of other infection? No   6. Do you have a cough, shortness of breath, or wheezing? No   7. Do you or anyone in your family have previous history of blood clots? No   8. Do you or does anyone in your family have a serious bleeding problem such as prolonged bleeding following surgeries or cuts? No   9. Have you ever had problems with anemia or been told to take iron pills? YES    10. Have you had any abnormal blood loss such as black, tarry or bloody stools, or abnormal vaginal bleeding? No   11. Have you ever had a blood transfusion? No   12. Are you willing to have a blood transfusion if it is medically needed before, during, or after your surgery? Yes   13. Have you or any of your relatives ever had problems with anesthesia? No   14. Do you have sleep apnea, excessive snoring or daytime drowsiness? No   15. Do you  have any artifical heart valves or other implanted medical devices like a pacemaker, defibrillator, or continuous glucose monitor? No   16. Do you have artificial joints? No   17. Are you allergic to latex? No   18. Is there any chance that you may be pregnant? No       Health Care Directive:  Patient does not have a Health Care Directive or Living Will: N/A    Preoperative Review of :   reviewed - controlled substances prescribed by other outside provider(s).    Status of Chronic Conditions:  See problem list for active medical problems.  Problems all longstanding and stable, except as noted/documented.  See ROS for pertinent symptoms related to these conditions.      Review of Systems  CONSTITUTIONAL: NEGATIVE for fever, chills, change in weight  INTEGUMENTARY/SKIN: NEGATIVE for worrisome rashes, moles or lesions  EYES: NEGATIVE for vision changes or irritation  ENT/MOUTH: NEGATIVE for ear, mouth and throat problems  RESP: NEGATIVE for significant cough or SOB  CV: NEGATIVE for chest pain, palpitations or peripheral edema  GI: NEGATIVE for nausea, abdominal pain, heartburn, or change in bowel habits  : NEGATIVE for frequency, dysuria, or hematuria  MUSCULOSKELETAL: NEGATIVE for significant arthralgias or myalgia  NEURO: NEGATIVE for weakness, dizziness or paresthesias  ENDOCRINE: NEGATIVE for temperature intolerance, skin/hair changes  HEME: NEGATIVE for bleeding problems  PSYCHIATRIC: NEGATIVE for changes in mood or affect    Patient Active Problem List    Diagnosis Date Noted     Restless leg syndrome 10/07/2021     Priority: Medium     Pain in thoracic spine 10/07/2021     Priority: Medium     Herpes simplex labialis 10/07/2021     Priority: Medium     Atypical chest pain 10/07/2021     Priority: Medium     Adjustment disorder with mixed emotional features 10/07/2021     Priority: Medium     Actinic keratosis 10/07/2021     Priority: Medium     Primary osteoarthritis of left knee 01/18/2021      Priority: Medium     Complex tear of lateral meniscus of left knee as current injury 01/18/2021     Priority: Medium     Menopause present 12/24/2018     Priority: Medium     Uterine prolapse 12/04/2018     Priority: Medium     Stress incontinence in female 12/04/2018     Priority: Medium     Osteoarthrosis 09/24/2015     Priority: Medium     Arthritis of hand 07/30/2015     Priority: Medium      Past Medical History:   Diagnosis Date     NO ACTIVE PROBLEMS      Past Surgical History:   Procedure Laterality Date     TONSILLECTOMY       Current Outpatient Medications   Medication Sig Dispense Refill     estradiol (ESTRACE) 0.1 MG/GM vaginal cream estradiol 0.01% (0.1 mg/gram) vaginal cream   Insert by vaginal route.       Multiple Vitamins-Minerals (MULTIVITAMIN ADULT EXTRA C PO)  (Patient not taking: Reported on 10/7/2021)       valACYclovir (VALTREX) 1000 mg tablet Take 2 tablets (2,000 mg) by mouth 2 times daily for 5 days 4 tablet 4       No Known Allergies     Social History     Tobacco Use     Smoking status: Never Smoker     Smokeless tobacco: Never Used   Substance Use Topics     Alcohol use: Yes     Alcohol/week: 2.0 standard drinks     Types: 2 Glasses of wine per week     Comment: 2x/week     Family History   Problem Relation Age of Onset     Hyperlipidemia Mother      Hyperlipidemia Father      Cerebrovascular Disease Paternal Grandfather      History   Drug Use Unknown         Objective     /78   Pulse 61   Temp 97.5  F (36.4  C) (Tympanic)   Wt 90.3 kg (199 lb)   SpO2 98%   BMI 32.12 kg/m      Physical Exam    GENERAL APPEARANCE: healthy, alert and no distress     EYES: EOMI, PERRL     HENT: ear canals and TM's normal and nose and mouth without ulcers or lesions     NECK: no adenopathy, no asymmetry, masses, or scars and thyroid normal to palpation     RESP: lungs clear to auscultation - no rales, rhonchi or wheezes     CV: regular rates and rhythm, normal S1 S2, no S3 or S4 and no murmur,  click or rub     ABDOMEN:  soft, nontender, no HSM or masses and bowel sounds normal     MS: extremities normal- no gross deformities noted, no evidence of inflammation in joints, FROM in all extremities.     SKIN: no suspicious lesions or rashes     NEURO: Normal strength and tone, sensory exam grossly normal, mentation intact and speech normal     PSYCH: mentation appears normal. and affect normal/bright     LYMPHATICS: No cervical adenopathy    Recent Labs   Lab Test 03/31/21  1419   HGB 14.0         POTASSIUM 3.8   CR 0.95        Diagnostics:  Labs pending at this time.  Results will be reviewed when available.  Recent Results (from the past 720 hour(s))   Asymptomatic COVID-19 Virus (Coronavirus) by PCR Nose    Collection Time: 09/24/21 11:09 AM    Specimen: Nose; Swab   Result Value Ref Range    SARS CoV2 PCR Negative Negative   COLONOSCOPY    Collection Time: 09/28/21  9:04 AM   Result Value Ref Range    COLONOSCOPY       [address] [place] [address] [phone]     Endoscopy Department  _______________________________________________________________________________  Patient Name: Sonja Morfin              Procedure Date: 9/28/2021 9:04 AM  MRN: [id number]                       Account Number: [id number]  YOB: 1960              Admit Type: Outpatient  Age: 61                                Gender: Female  Note Status: Finalized                Attending MD: Mario Wilkes MD  Total Sedation Time:                    _______________________________________________________________________________     Procedure:           Colonoscopy  Indications:         Surveillance: Personal history of adenomatous polyps on                        last colonoscopy 3 years ago  Providers:           Mario Wilkes MD, Margaux Bear, Jaylene Tena, RN  Referring MD:        Danyell Pearson MD  Requesting Provider: Danyell Pearson,  MD  Medici rios:           Midazolam 4 mg IV, Fentanyl 150 micrograms IV,                        Diphenhydramine 50 mg IV  Complications:       No immediate complications.  _______________________________________________________________________________  Procedure:           Pre-Anesthesia Assessment:                       - See EPIC H and P note                       After obtaining informed consent, the colonoscope was                        passed under direct vision. Throughout the procedure,                        the patient's blood pressure, pulse, and oxygen                        saturations were monitored continuously. The Colonoscope                        was introduced through the anus and advanced to the                        terminal ileum, with identification of the appendiceal                        orifice and IC valve. The colonoscopy was performed                        without difficulty. The patient tolerated the procedure                        well. The quality of the bowel  preparation was evaluated                        using the BBPS ([place] Bowel Preparation Scale) with                        scores of: Right Colon = 3, Transverse Colon = 3 and                        Left Colon = 3 (entire mucosa seen well with no residual                        staining, small fragments of stool or opaque liquid).                        The total BBPS score equals 9.                                                                                   Findings:       Skin tags were found on perianal exam.       The terminal ileum appeared normal.       Two sessile polyps were found in the sigmoid colon. The polyps were 3 to        4 mm in size. These polyps were removed with a cold snare. Resection and        retrieval were complete.       Non-bleeding internal hemorrhoids were found during retroflexion.       The exam was otherwise without abnormality on direct and retroflexion        views.                                                                                     Moderate Sedation:       Moderate (conscious) sedation was administered by the endoscopy nurse        and supervised by the endoscopist. The patient's oxygen saturation,        heart rate, blood pressure and response to care were monitored. Total        physician intraservice time was 21 minutes.  Impression:          - Perianal skin tags found on perianal exam.                       - The examined portion of the ileum was normal.                       - Two 3 to 4 mm polyps in the sigmoid colon, removed                        with a cold snare. Resected and retrieved.                       - Non-bleeding internal hemorrhoids.                       - The examination was otherwise normal on direct and                        retroflexion views.  Recommendation:      - Discharge patient to home (with escort).                       - Resume previous diet.                       - Continue present medications.                       - Await pathology results.                       - Repea t colonoscopy in 7 years for surveillance based                        on pathology results.                       - Return to primary care physician.                                                                                       ____________________  Mario Wilkes MD  9/28/2021 9:52:02 AM  I was physically present for the entire viewing portion of the exam.  __________________________  Signature of teaching physician  [id number] Wilkes MD  Number of Addenda: 0    Note Initiated On: 9/28/2021 9:04 AM  Scope In:  Scope Out:     Surgical Pathology Exam    Collection Time: 09/28/21  9:40 AM   Result Value Ref Range    Case Report       Surgical Pathology Report                         Case: [id number]                                  Authorizing Provider:  Mario Wilkes,  Collected:           09/28/2021 09:40 AM                                 MD     "                                                                       Ordering Location:     [facility]          Received:            09/28/2021 10:30 AM                                 Endoscopy                                                                    Pathologist:           Candelaria Boyd MD                                                   Specimen:    Large Intestine, Colon, Sigmoid, Sigmoid polyps x2                                         Final Diagnosis       A(1). Sigmoid polyps x2:  -Tubular adenoma (1); negative for high-grade dysplasia  -Additional fragment of benign colonic mucosa        Clinical Information       History of adenomatous colonic polyps      Gross Description       A(1). Large Intestine, Colon, Sigmoid, Sigmoid polyps x2:  The specimen is received in formalin with proper patient identification, labeled \"sigmoid polyps x2\".  The specimen consists of white-tan to white-red polypoid fragments measuring 0.6 and 0.7 cm in greatest dimension.  Specimen is wrapped and entirely submitted in cassette A1.          Microscopic Description       Microscopic examination is performed.         Performing Labs       The technical component of this testing was completed at [facility]      Case Images     CBC with platelets    Collection Time: 10/07/21  2:07 PM   Result Value Ref Range    WBC Count 5.9 4.0 - 11.0 10e3/uL    RBC Count 4.55 3.80 - 5.20 10e6/uL    Hemoglobin 14.6 11.7 - 15.7 g/dL    Hematocrit 44.7 35.0 - 47.0 %    MCV 98 78 - 100 fL    MCH 32.1 26.5 - 33.0 pg    MCHC 32.7 31.5 - 36.5 g/dL    RDW 13.2 10.0 - 15.0 %    Platelet Count 228 150 - 450 10e3/uL      EKG required for General anaesthesia , baseline and not completed in the last 90 days.   EKG: sinus bradycardia, normal axis, normal intervals, no acute ST/T changes c/w ischemia, no LVH by voltage criteria, unchanged from previous tracings    Revised " Cardiac Risk Index (RCRI):  The patient has the following serious cardiovascular risks for perioperative complications:   - No serious cardiac risks = 0 points     RCRI Interpretation: 0 points: Class I (very low risk - 0.4% complication rate)           Signed Electronically by: Luisana Nj MD  Copy of this evaluation report is provided to requesting physician.

## 2021-10-08 ENCOUNTER — OFFICE VISIT (OUTPATIENT)
Dept: VASCULAR SURGERY | Facility: CLINIC | Age: 61
End: 2021-10-08
Payer: COMMERCIAL

## 2021-10-08 DIAGNOSIS — I78.1 SPIDER VEINS: Primary | ICD-10-CM

## 2021-10-08 LAB
ALBUMIN SERPL-MCNC: 3.8 G/DL (ref 3.4–5)
ALP SERPL-CCNC: 70 U/L (ref 40–150)
ALT SERPL W P-5'-P-CCNC: 31 U/L (ref 0–50)
ANION GAP SERPL CALCULATED.3IONS-SCNC: 5 MMOL/L (ref 3–14)
AST SERPL W P-5'-P-CCNC: 42 U/L (ref 0–45)
BILIRUB SERPL-MCNC: 0.6 MG/DL (ref 0.2–1.3)
BUN SERPL-MCNC: 15 MG/DL (ref 7–30)
CALCIUM SERPL-MCNC: 9.2 MG/DL (ref 8.5–10.1)
CHLORIDE BLD-SCNC: 108 MMOL/L (ref 94–109)
CO2 SERPL-SCNC: 26 MMOL/L (ref 20–32)
CREAT SERPL-MCNC: 0.93 MG/DL (ref 0.52–1.04)
GFR SERPL CREATININE-BSD FRML MDRD: 67 ML/MIN/1.73M2
GLUCOSE BLD-MCNC: 87 MG/DL (ref 70–99)
HCV AB SERPL QL IA: NONREACTIVE
HIV 1+2 AB+HIV1 P24 AG SERPL QL IA: NONREACTIVE
POTASSIUM BLD-SCNC: 3.6 MMOL/L (ref 3.4–5.3)
PROT SERPL-MCNC: 7 G/DL (ref 6.8–8.8)
SODIUM SERPL-SCNC: 139 MMOL/L (ref 133–144)

## 2021-10-08 PROCEDURE — 99202 OFFICE O/P NEW SF 15 MIN: CPT | Performed by: SURGERY

## 2021-10-08 NOTE — PROGRESS NOTES
After Visit Follow Up  Per Dr. PRAJAPATI, patient was recommended to have 2 - 4 sessions at $400 of cosmetic sclerotherapy.    Reviewed with and gave to patient our vein clinic sclerotherapy packet of information which includes basic sclerotherapy information, pre-treatment instructions and post-treatment instructions.    Patient in agreement with plan and had no further questions.    Sheree Choudhary MA on 10/8/2021 at 3:00 PM

## 2021-10-08 NOTE — LETTER
10/8/2021         RE: Sonja Morfin  [address]  [place] [address]        Dear Colleague,    Thank you for referring your patient, Sonja Morfin, to the [facility] [place]. Please see a copy of my visit note below.    After Visit Follow Up  Per Dr. PRAJAPATI, patient was recommended to have 2 - 4 sessions at $400 of cosmetic sclerotherapy.    Reviewed with and gave to patient our vein clinic sclerotherapy packet of information which includes basic sclerotherapy information, pre-treatment instructions and post-treatment instructions.    Patient in agreement with plan and had no further questions.    Sheree Choudhary MA on 10/8/2021 at 3:00 PM    [facility] CONSULTATION    HPI:    Sonja Morfin is a pleasant 61 year old female referred by Dr. 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  She has had telangiectasias since her first pregnancy and has had them slowly progress.  She has had two sets of twins and another pregnancy with five children total.  She has never really taken the time to have them treated.    She has no history of superficial thrombophlebitis, deep vein thrombosis or hemorrhage.  She does not have edema.  She has not noted any varicose veins.    Her family history is significant for varicose veins and three of her daughters.    PAST MEDICAL HISTORY:   Past Medical History:   Diagnosis Date     NO ACTIVE PROBLEMS        PAST SURGICAL HISTORY:   Past Surgical History:   Procedure Laterality Date     TONSILLECTOMY         FAMILY HISTORY:   Family History   Problem Relation Age of Onset     Hyperlipidemia Mother      Hyperlipidemia Father      Cerebrovascular Disease Paternal Grandfather        SOCIAL HISTORY:   Social History     Tobacco Use     Smoking status: Never Smoker     Smokeless tobacco: Never Used   Substance Use Topics     Alcohol use: Yes     Alcohol/week: 2.0 standard  drinks     Types: 2 Glasses of wine per week     Comment: 2x/week       REVIEW OF SYSTEMS: Review Of Systems  Skin: negative  Eyes: negative  Ears/Nose/Throat: negative  Respiratory: No shortness of breath, dyspnea on exertion, cough, or hemoptysis  Cardiovascular: negative  Gastrointestinal: negative  Genitourinary: Uterine prolapse  Musculoskeletal: Left knee meniscus injury with recent surgery  Neurologic: negative  Psychiatric: negative  Hematologic/Lymphatic/Immunologic: negative  Endocrine: negative      Vital signs:  There were no vitals taken for this visit.    Current Outpatient Medications   Medication Sig Dispense Refill     estradiol (ESTRACE) 0.1 MG/GM vaginal cream estradiol 0.01% (0.1 mg/gram) vaginal cream   Insert by vaginal route.       Multiple Vitamins-Minerals (MULTIVITAMIN ADULT EXTRA C PO)  (Patient not taking: Reported on 10/7/2021)       valACYclovir (VALTREX) 1000 mg tablet Take 2 tablets (2,000 mg) by mouth 2 times daily for 5 days 4 tablet 4       PHYSICAL EXAM:  General: Pleasant, NAD.   HEENT: Normocephalic, atraumatic, external ears and nose normal.   Respiratory: Normal respiratory effort.   Cardiovascular: Pulse is regular.   Musculoskeletal: Gait and station normal.  The joints of her fingers and toes without deformity.  Scar over left knee from what sounds like a complicated meniscal repair.  There is no cyanosis of her nailbeds.   EXTREMITIES: Scattered, bilateral lower extremity telangiectasias, most numerous over the anterolateral thighs, right greater than left.  She also has some patches of telangiectasias over the left anterolateral leg    Reticular veins are noted in the right popliteal fossa and to a lesser extent in the left popliteal fossa.  No significant edema of either ankle.  A few purple telangiectasias about the medial ankles bilaterally..    PULSES: R/L (3=normal pulse, 0=no palpable pulse) dorsalis pedis: 3/3; posterior tibial: 3/3.      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  We discussed the low risk of conservative management which would include hemorrhage.  The only reason I have been treating this for cosmetic purposes.    We briefly discussed options for treating telangiectasias with either laser or injection sclerotherapy.  Risk of sclerotherapy including allergic reaction, ulceration, deep antibiosis, superficial thrombophlebitis and hyperpigmentation were discussed.  She understands that this would not be covered insurance will be her responsibility.  She is in call for this.    PLAN:  She is having a GYN surgery in the near future and may return a few weeks after her surgery to have sclerotherapy performed.  She also mentioned the fact that her daughters have telangiectasias and may wish to have treatment as well.    Fabrice Collazo MD    Dictated using Dragon voice recognition software which may result in transcription errors          [facility] NEW PATIENT:                  Again, thank you for allowing me to participate in the care of your patient.        Sincerely,        Fabrice Collazo MD

## 2021-10-08 NOTE — PROGRESS NOTES
[facility] CONSULTATION    HPI:    Sonja Morfin is a pleasant 61 year old female referred by Dr. 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  She has had telangiectasias since her first pregnancy and has had them slowly progress.  She has had two sets of twins and another pregnancy with five children total.  She has never really taken the time to have them treated.    She has no history of superficial thrombophlebitis, deep vein thrombosis or hemorrhage.  She does not have edema.  She has not noted any varicose veins.    Her family history is significant for varicose veins and three of her daughters.    PAST MEDICAL HISTORY:   Past Medical History:   Diagnosis Date     NO ACTIVE PROBLEMS        PAST SURGICAL HISTORY:   Past Surgical History:   Procedure Laterality Date     TONSILLECTOMY         FAMILY HISTORY:   Family History   Problem Relation Age of Onset     Hyperlipidemia Mother      Hyperlipidemia Father      Cerebrovascular Disease Paternal Grandfather        SOCIAL HISTORY:   Social History     Tobacco Use     Smoking status: Never Smoker     Smokeless tobacco: Never Used   Substance Use Topics     Alcohol use: Yes     Alcohol/week: 2.0 standard drinks     Types: 2 Glasses of wine per week     Comment: 2x/week       REVIEW OF SYSTEMS: Review Of Systems  Skin: negative  Eyes: negative  Ears/Nose/Throat: negative  Respiratory: No shortness of breath, dyspnea on exertion, cough, or hemoptysis  Cardiovascular: negative  Gastrointestinal: negative  Genitourinary: Uterine prolapse  Musculoskeletal: Left knee meniscus injury with recent surgery  Neurologic: negative  Psychiatric: negative  Hematologic/Lymphatic/Immunologic: negative  Endocrine: negative      Vital signs:  There were no vitals taken for this visit.    Current Outpatient Medications   Medication Sig Dispense Refill     estradiol (ESTRACE) 0.1 MG/GM vaginal cream  estradiol 0.01% (0.1 mg/gram) vaginal cream   Insert by vaginal route.       Multiple Vitamins-Minerals (MULTIVITAMIN ADULT EXTRA C PO)  (Patient not taking: Reported on 10/7/2021)       valACYclovir (VALTREX) 1000 mg tablet Take 2 tablets (2,000 mg) by mouth 2 times daily for 5 days 4 tablet 4       PHYSICAL EXAM:  General: Pleasant, NAD.   HEENT: Normocephalic, atraumatic, external ears and nose normal.   Respiratory: Normal respiratory effort.   Cardiovascular: Pulse is regular.   Musculoskeletal: Gait and station normal.  The joints of her fingers and toes without deformity.  Scar over left knee from what sounds like a complicated meniscal repair.  There is no cyanosis of her nailbeds.   EXTREMITIES: Scattered, bilateral lower extremity telangiectasias, most numerous over the anterolateral thighs, right greater than left.  She also has some patches of telangiectasias over the left anterolateral leg    Reticular veins are noted in the right popliteal fossa and to a lesser extent in the left popliteal fossa.  No significant edema of either ankle.  A few purple telangiectasias about the medial ankles bilaterally..    PULSES: R/L (3=normal pulse, 0=no palpable pulse) dorsalis pedis: 3/3; posterior tibial: 3/3.      Neurologic: Grossly normal  Psychiatric: Mood, affect, judgment and insight are normal     ASSESSMENT:  Bilateral extremity spider telangiectasias of cosmetic concern.  We discussed the low risk of conservative management which would include hemorrhage.  The only reason I have been treating this for cosmetic purposes.    We briefly discussed options for treating telangiectasias with either laser or injection sclerotherapy.  Risk of sclerotherapy including allergic reaction, ulceration, deep antibiosis, superficial thrombophlebitis and hyperpigmentation were discussed.  She understands that this would not be covered insurance will be her responsibility.  She is in call for this.    PLAN:  She is having a  GYN surgery in the near future and may return a few weeks after her surgery to have sclerotherapy performed.  She also mentioned the fact that her daughters have telangiectasias and may wish to have treatment as well.    Fabrice Collazo MD    Dictated using Dragon voice recognition software which may result in transcription errors          [facility] NEW PATIENT:

## 2021-10-15 RX ORDER — PHENYLEPHRINE HCL 10 MG
1 TABLET ORAL DAILY
COMMUNITY
End: 2022-08-30

## 2021-10-15 RX ORDER — CELECOXIB 200 MG/1
200 CAPSULE ORAL 2 TIMES DAILY
COMMUNITY
End: 2022-01-27

## 2021-10-15 RX ORDER — GABAPENTIN 300 MG/1
300 CAPSULE ORAL DAILY PRN
COMMUNITY
End: 2022-08-30

## 2021-10-19 ENCOUNTER — LAB (OUTPATIENT)
Dept: LAB | Facility: CLINIC | Age: 61
End: 2021-10-19
Attending: OBSTETRICS & GYNECOLOGY

## 2021-10-19 DIAGNOSIS — Z11.59 ENCOUNTER FOR SCREENING FOR OTHER VIRAL DISEASES: ICD-10-CM

## 2021-10-19 LAB — SARS-COV-2 RNA RESP QL NAA+PROBE: NEGATIVE

## 2021-10-19 PROCEDURE — U0005 INFEC AGEN DETEC AMPLI PROBE: HCPCS | Mod: 90 | Performed by: PATHOLOGY

## 2021-10-19 PROCEDURE — U0003 INFECTIOUS AGENT DETECTION BY NUCLEIC ACID (DNA OR RNA); SEVERE ACUTE RESPIRATORY SYNDROME CORONAVIRUS 2 (SARS-COV-2) (CORONAVIRUS DISEASE [COVID-19]), AMPLIFIED PROBE TECHNIQUE, MAKING USE OF HIGH THROUGHPUT TECHNOLOGIES AS DESCRIBED BY CMS-2020-01-R: HCPCS | Mod: 90 | Performed by: PATHOLOGY

## 2021-10-19 RX ORDER — VALACYCLOVIR HYDROCHLORIDE 1 G/1
1000 TABLET, FILM COATED ORAL 2 TIMES DAILY PRN
COMMUNITY
End: 2022-08-30

## 2021-10-19 NOTE — PROGRESS NOTES
PTA medications updated by Medication Scribe prior to surgery via phone call with patient (last doses completed by Nurse)     Medication history sources: Patient, H&P and Patient's home med list  In the past week, patient estimated taking medication this percent of the time: Greater than 90%  Adherence assessment: N/A Not Observed    Significant changes made to the medication list:  None      Additional medication history information:   None    Medication reconciliation completed by provider prior to medication history? No    Time spent in this activity: 25 MINUTES    The information provided in this note is only as accurate as the sources available at the time of update(s)      Prior to Admission medications    Medication Sig Last Dose Taking? Auth Provider   celecoxib (CELEBREX) 200 MG capsule Take 200 mg by mouth 2 times daily  at PM Yes Reported, Patient   estradiol (ESTRACE) 0.1 MG/GM vaginal cream Place 1 g vaginally daily   at PM Yes Reported, Patient   ferrous fumarate 65 mg, [place]. FE,-Vitamin C 125 mg (VITRON C)  MG TABS tablet Take 1 tablet by mouth daily AT NOON  at 1200 Yes Reported, Patient   Fish Oil-Cholecalciferol (OMEGA-3 + D) 500-200 MG-UNIT CAPS Take 1 capsule by mouth daily  at PM Yes Reported, Patient   gabapentin (NEURONTIN) 300 MG capsule Take 300 mg by mouth daily as needed (RESTLESS LEGS)   at PM Yes Reported, Patient   Multiple Vitamins-Minerals (WOMENS ONE DAILY) TABS Take 1 tablet by mouth daily  at AM Yes Reported, Patient   valACYclovir (VALTREX) 1000 mg tablet Take 1,000 mg by mouth 2 times daily as needed (COLD SORES)  at PRN Yes Reported, Patient

## 2021-10-20 ENCOUNTER — HOSPITAL ENCOUNTER (OUTPATIENT)
Facility: CLINIC | Age: 61
Discharge: HOME | End: 2021-10-21
Attending: OBSTETRICS & GYNECOLOGY | Admitting: OBSTETRICS & GYNECOLOGY
Payer: COMMERCIAL

## 2021-10-20 ENCOUNTER — ANESTHESIA EVENT (OUTPATIENT)
Dept: SURGERY | Facility: CLINIC | Age: 61
End: 2021-10-20
Payer: COMMERCIAL

## 2021-10-20 ENCOUNTER — ANESTHESIA (OUTPATIENT)
Dept: SURGERY | Facility: CLINIC | Age: 61
End: 2021-10-20
Payer: COMMERCIAL

## 2021-10-20 DIAGNOSIS — N39.3 STRESS INCONTINENCE IN FEMALE: Primary | ICD-10-CM

## 2021-10-20 DIAGNOSIS — N81.4 UTERINE PROLAPSE: ICD-10-CM

## 2021-10-20 LAB
B-HCG SERPL-ACNC: 2 IU/L (ref 0–5)
HGB BLD-MCNC: 14.7 G/DL (ref 11.7–15.7)

## 2021-10-20 PROCEDURE — C1771 REP DEV, URINARY, W/SLING: HCPCS | Performed by: OBSTETRICS & GYNECOLOGY

## 2021-10-20 PROCEDURE — 258N000001 HC RX 258: Performed by: OBSTETRICS & GYNECOLOGY

## 2021-10-20 PROCEDURE — 250N000011 HC RX IP 250 OP 636: Performed by: NURSE ANESTHETIST, CERTIFIED REGISTERED

## 2021-10-20 PROCEDURE — 710N000009 HC RECOVERY PHASE 1, LEVEL 1, PER MIN: Performed by: OBSTETRICS & GYNECOLOGY

## 2021-10-20 PROCEDURE — 250N000011 HC RX IP 250 OP 636: Performed by: ANESTHESIOLOGY

## 2021-10-20 PROCEDURE — 250N000009 HC RX 250: Performed by: OBSTETRICS & GYNECOLOGY

## 2021-10-20 PROCEDURE — 272N000001 HC OR GENERAL SUPPLY STERILE: Performed by: OBSTETRICS & GYNECOLOGY

## 2021-10-20 PROCEDURE — 250N000013 HC RX MED GY IP 250 OP 250 PS 637: Performed by: OBSTETRICS & GYNECOLOGY

## 2021-10-20 PROCEDURE — 258N000003 HC RX IP 258 OP 636: Performed by: ANESTHESIOLOGY

## 2021-10-20 PROCEDURE — 250N000025 HC SEVOFLURANE, PER MIN: Performed by: OBSTETRICS & GYNECOLOGY

## 2021-10-20 PROCEDURE — 370N000017 HC ANESTHESIA TECHNICAL FEE, PER MIN: Performed by: OBSTETRICS & GYNECOLOGY

## 2021-10-20 PROCEDURE — 88307 TISSUE EXAM BY PATHOLOGIST: CPT | Mod: TC | Performed by: OBSTETRICS & GYNECOLOGY

## 2021-10-20 PROCEDURE — 85018 HEMOGLOBIN: CPT | Performed by: PHYSICIAN ASSISTANT

## 2021-10-20 PROCEDURE — 250N000011 HC RX IP 250 OP 636: Performed by: PHYSICIAN ASSISTANT

## 2021-10-20 PROCEDURE — 250N000013 HC RX MED GY IP 250 OP 250 PS 637: Performed by: PHYSICIAN ASSISTANT

## 2021-10-20 PROCEDURE — C1781 MESH (IMPLANTABLE): HCPCS | Performed by: OBSTETRICS & GYNECOLOGY

## 2021-10-20 PROCEDURE — 999N000141 HC STATISTIC PRE-PROCEDURE NURSING ASSESSMENT: Performed by: OBSTETRICS & GYNECOLOGY

## 2021-10-20 PROCEDURE — 250N000009 HC RX 250: Performed by: NURSE ANESTHETIST, CERTIFIED REGISTERED

## 2021-10-20 PROCEDURE — 360N000080 HC SURGERY LEVEL 7, PER MIN: Performed by: OBSTETRICS & GYNECOLOGY

## 2021-10-20 PROCEDURE — 84702 CHORIONIC GONADOTROPIN TEST: CPT | Performed by: PHYSICIAN ASSISTANT

## 2021-10-20 PROCEDURE — 250N000011 HC RX IP 250 OP 636: Performed by: OBSTETRICS & GYNECOLOGY

## 2021-10-20 PROCEDURE — 36415 COLL VENOUS BLD VENIPUNCTURE: CPT | Performed by: PHYSICIAN ASSISTANT

## 2021-10-20 PROCEDURE — 258N000003 HC RX IP 258 OP 636: Performed by: OBSTETRICS & GYNECOLOGY

## 2021-10-20 DEVICE — TRANSVAGINAL MID-URETHRAL SLING
Type: IMPLANTABLE DEVICE | Site: VAGINA | Status: FUNCTIONAL
Brand: ADVANTAGE FIT™  SYSTEM

## 2021-10-20 DEVICE — ARTISYN Y-SHAPED MESH
Type: IMPLANTABLE DEVICE | Site: VAGINA | Status: FUNCTIONAL
Brand: ARTISYN

## 2021-10-20 RX ORDER — MEPERIDINE HYDROCHLORIDE 25 MG/ML
12.5 INJECTION INTRAMUSCULAR; INTRAVENOUS; SUBCUTANEOUS ONCE
Status: COMPLETED | OUTPATIENT
Start: 2021-10-20 | End: 2021-10-20

## 2021-10-20 RX ORDER — LIDOCAINE 40 MG/G
CREAM TOPICAL
Status: DISCONTINUED | OUTPATIENT
Start: 2021-10-20 | End: 2021-10-21 | Stop reason: HOSPADM

## 2021-10-20 RX ORDER — MAGNESIUM HYDROXIDE 1200 MG/15ML
LIQUID ORAL PRN
Status: DISCONTINUED | OUTPATIENT
Start: 2021-10-20 | End: 2021-10-20 | Stop reason: HOSPADM

## 2021-10-20 RX ORDER — SODIUM CHLORIDE, SODIUM LACTATE, POTASSIUM CHLORIDE, CALCIUM CHLORIDE 600; 310; 30; 20 MG/100ML; MG/100ML; MG/100ML; MG/100ML
INJECTION, SOLUTION INTRAVENOUS CONTINUOUS
Status: DISCONTINUED | OUTPATIENT
Start: 2021-10-20 | End: 2021-10-20 | Stop reason: HOSPADM

## 2021-10-20 RX ORDER — ONDANSETRON 4 MG/1
4-8 TABLET, ORALLY DISINTEGRATING ORAL EVERY 8 HOURS PRN
Qty: 10 TABLET | Refills: 0 | Status: SHIPPED | OUTPATIENT
Start: 2021-10-20 | End: 2022-01-27

## 2021-10-20 RX ORDER — HYDROXYZINE HYDROCHLORIDE 25 MG/1
25 TABLET, FILM COATED ORAL EVERY 6 HOURS PRN
Qty: 30 TABLET | Refills: 0 | Status: SHIPPED | OUTPATIENT
Start: 2021-10-20 | End: 2022-01-27

## 2021-10-20 RX ORDER — CEFAZOLIN SODIUM 2 G/100ML
2 INJECTION, SOLUTION INTRAVENOUS SEE ADMIN INSTRUCTIONS
Status: DISCONTINUED | OUTPATIENT
Start: 2021-10-20 | End: 2021-10-20 | Stop reason: HOSPADM

## 2021-10-20 RX ORDER — ONDANSETRON 2 MG/ML
4 INJECTION INTRAMUSCULAR; INTRAVENOUS EVERY 30 MIN PRN
Status: DISCONTINUED | OUTPATIENT
Start: 2021-10-20 | End: 2021-10-20 | Stop reason: HOSPADM

## 2021-10-20 RX ORDER — OXYCODONE HYDROCHLORIDE 5 MG/1
5-10 TABLET ORAL EVERY 6 HOURS PRN
Qty: 12 TABLET | Refills: 0 | Status: SHIPPED | OUTPATIENT
Start: 2021-10-20 | End: 2021-10-21

## 2021-10-20 RX ORDER — GLYCOPYRROLATE 0.2 MG/ML
INJECTION, SOLUTION INTRAMUSCULAR; INTRAVENOUS PRN
Status: DISCONTINUED | OUTPATIENT
Start: 2021-10-20 | End: 2021-10-20

## 2021-10-20 RX ORDER — IBUPROFEN 600 MG/1
600 TABLET, FILM COATED ORAL EVERY 6 HOURS PRN
Qty: 30 TABLET | Refills: 0 | Status: SHIPPED | OUTPATIENT
Start: 2021-10-20 | End: 2022-08-30

## 2021-10-20 RX ORDER — PROPOFOL 10 MG/ML
INJECTION, EMULSION INTRAVENOUS PRN
Status: DISCONTINUED | OUTPATIENT
Start: 2021-10-20 | End: 2021-10-20

## 2021-10-20 RX ORDER — NALOXONE HYDROCHLORIDE 0.4 MG/ML
0.2 INJECTION, SOLUTION INTRAMUSCULAR; INTRAVENOUS; SUBCUTANEOUS
Status: DISCONTINUED | OUTPATIENT
Start: 2021-10-20 | End: 2021-10-21 | Stop reason: HOSPADM

## 2021-10-20 RX ORDER — PROPOFOL 10 MG/ML
INJECTION, EMULSION INTRAVENOUS CONTINUOUS PRN
Status: DISCONTINUED | OUTPATIENT
Start: 2021-10-20 | End: 2021-10-20

## 2021-10-20 RX ORDER — FAMOTIDINE 20 MG/1
20 TABLET, FILM COATED ORAL 2 TIMES DAILY
Status: DISCONTINUED | OUTPATIENT
Start: 2021-10-20 | End: 2021-10-21 | Stop reason: HOSPADM

## 2021-10-20 RX ORDER — BUPIVACAINE HYDROCHLORIDE AND EPINEPHRINE 2.5; 5 MG/ML; UG/ML
INJECTION, SOLUTION INFILTRATION; PERINEURAL PRN
Status: DISCONTINUED | OUTPATIENT
Start: 2021-10-20 | End: 2021-10-20 | Stop reason: HOSPADM

## 2021-10-20 RX ORDER — HYDROMORPHONE HCL IN WATER/PF 6 MG/30 ML
0.2 PATIENT CONTROLLED ANALGESIA SYRINGE INTRAVENOUS EVERY 5 MIN PRN
Status: DISCONTINUED | OUTPATIENT
Start: 2021-10-20 | End: 2021-10-20 | Stop reason: HOSPADM

## 2021-10-20 RX ORDER — NEOSTIGMINE METHYLSULFATE 1 MG/ML
VIAL (ML) INJECTION PRN
Status: DISCONTINUED | OUTPATIENT
Start: 2021-10-20 | End: 2021-10-20

## 2021-10-20 RX ORDER — HYDROXYZINE HYDROCHLORIDE 25 MG/1
25 TABLET, FILM COATED ORAL EVERY 6 HOURS PRN
Status: DISCONTINUED | OUTPATIENT
Start: 2021-10-20 | End: 2021-10-21 | Stop reason: HOSPADM

## 2021-10-20 RX ORDER — OXYCODONE HYDROCHLORIDE 5 MG/1
5 TABLET ORAL EVERY 4 HOURS PRN
Status: DISCONTINUED | OUTPATIENT
Start: 2021-10-20 | End: 2021-10-20 | Stop reason: HOSPADM

## 2021-10-20 RX ORDER — FENTANYL CITRATE 50 UG/ML
INJECTION, SOLUTION INTRAMUSCULAR; INTRAVENOUS PRN
Status: DISCONTINUED | OUTPATIENT
Start: 2021-10-20 | End: 2021-10-20

## 2021-10-20 RX ORDER — ACETAMINOPHEN 325 MG/1
975 TABLET ORAL ONCE
Status: COMPLETED | OUTPATIENT
Start: 2021-10-20 | End: 2021-10-20

## 2021-10-20 RX ORDER — IBUPROFEN 600 MG/1
600 TABLET, FILM COATED ORAL EVERY 6 HOURS PRN
Status: DISCONTINUED | OUTPATIENT
Start: 2021-10-21 | End: 2021-10-21 | Stop reason: HOSPADM

## 2021-10-20 RX ORDER — OXYCODONE HYDROCHLORIDE 5 MG/1
10 TABLET ORAL EVERY 4 HOURS PRN
Status: DISCONTINUED | OUTPATIENT
Start: 2021-10-20 | End: 2021-10-21 | Stop reason: HOSPADM

## 2021-10-20 RX ORDER — HYDROMORPHONE HCL IN WATER/PF 6 MG/30 ML
0.4 PATIENT CONTROLLED ANALGESIA SYRINGE INTRAVENOUS
Status: DISCONTINUED | OUTPATIENT
Start: 2021-10-20 | End: 2021-10-21 | Stop reason: HOSPADM

## 2021-10-20 RX ORDER — KETOROLAC TROMETHAMINE 30 MG/ML
30 INJECTION, SOLUTION INTRAMUSCULAR; INTRAVENOUS EVERY 6 HOURS
Status: COMPLETED | OUTPATIENT
Start: 2021-10-20 | End: 2021-10-21

## 2021-10-20 RX ORDER — NALOXONE HYDROCHLORIDE 0.4 MG/ML
0.4 INJECTION, SOLUTION INTRAMUSCULAR; INTRAVENOUS; SUBCUTANEOUS
Status: DISCONTINUED | OUTPATIENT
Start: 2021-10-20 | End: 2021-10-21 | Stop reason: HOSPADM

## 2021-10-20 RX ORDER — OXYCODONE HYDROCHLORIDE 5 MG/1
5 TABLET ORAL EVERY 4 HOURS PRN
Status: DISCONTINUED | OUTPATIENT
Start: 2021-10-20 | End: 2021-10-21 | Stop reason: HOSPADM

## 2021-10-20 RX ORDER — HYDROMORPHONE HCL IN WATER/PF 6 MG/30 ML
0.2 PATIENT CONTROLLED ANALGESIA SYRINGE INTRAVENOUS
Status: DISCONTINUED | OUTPATIENT
Start: 2021-10-20 | End: 2021-10-21 | Stop reason: HOSPADM

## 2021-10-20 RX ORDER — ONDANSETRON 4 MG/1
4 TABLET, ORALLY DISINTEGRATING ORAL EVERY 6 HOURS PRN
Status: DISCONTINUED | OUTPATIENT
Start: 2021-10-20 | End: 2021-10-21 | Stop reason: HOSPADM

## 2021-10-20 RX ORDER — CEFAZOLIN SODIUM 2 G/100ML
2 INJECTION, SOLUTION INTRAVENOUS
Status: COMPLETED | OUTPATIENT
Start: 2021-10-20 | End: 2021-10-20

## 2021-10-20 RX ORDER — DEXAMETHASONE SODIUM PHOSPHATE 4 MG/ML
INJECTION, SOLUTION INTRA-ARTICULAR; INTRALESIONAL; INTRAMUSCULAR; INTRAVENOUS; SOFT TISSUE PRN
Status: DISCONTINUED | OUTPATIENT
Start: 2021-10-20 | End: 2021-10-20

## 2021-10-20 RX ORDER — LIDOCAINE HYDROCHLORIDE 20 MG/ML
INJECTION, SOLUTION INFILTRATION; PERINEURAL PRN
Status: DISCONTINUED | OUTPATIENT
Start: 2021-10-20 | End: 2021-10-20

## 2021-10-20 RX ORDER — PROCHLORPERAZINE MALEATE 10 MG
10 TABLET ORAL EVERY 6 HOURS PRN
Status: DISCONTINUED | OUTPATIENT
Start: 2021-10-20 | End: 2021-10-21 | Stop reason: HOSPADM

## 2021-10-20 RX ORDER — ONDANSETRON 2 MG/ML
4 INJECTION INTRAMUSCULAR; INTRAVENOUS EVERY 6 HOURS PRN
Status: DISCONTINUED | OUTPATIENT
Start: 2021-10-20 | End: 2021-10-21 | Stop reason: HOSPADM

## 2021-10-20 RX ORDER — ONDANSETRON 2 MG/ML
INJECTION INTRAMUSCULAR; INTRAVENOUS PRN
Status: DISCONTINUED | OUTPATIENT
Start: 2021-10-20 | End: 2021-10-20

## 2021-10-20 RX ORDER — FENTANYL CITRATE 0.05 MG/ML
25 INJECTION, SOLUTION INTRAMUSCULAR; INTRAVENOUS EVERY 5 MIN PRN
Status: DISCONTINUED | OUTPATIENT
Start: 2021-10-20 | End: 2021-10-20 | Stop reason: HOSPADM

## 2021-10-20 RX ORDER — SODIUM CHLORIDE, SODIUM LACTATE, POTASSIUM CHLORIDE, CALCIUM CHLORIDE 600; 310; 30; 20 MG/100ML; MG/100ML; MG/100ML; MG/100ML
INJECTION, SOLUTION INTRAVENOUS CONTINUOUS
Status: DISCONTINUED | OUTPATIENT
Start: 2021-10-20 | End: 2021-10-21 | Stop reason: HOSPADM

## 2021-10-20 RX ORDER — ONDANSETRON 4 MG/1
4 TABLET, ORALLY DISINTEGRATING ORAL EVERY 30 MIN PRN
Status: DISCONTINUED | OUTPATIENT
Start: 2021-10-20 | End: 2021-10-20 | Stop reason: HOSPADM

## 2021-10-20 RX ORDER — ACETAMINOPHEN 325 MG/1
650 TABLET ORAL EVERY 4 HOURS PRN
Qty: 100 TABLET | Refills: 0 | Status: SHIPPED | OUTPATIENT
Start: 2021-10-20 | End: 2022-08-30

## 2021-10-20 RX ADMIN — MEPERIDINE HYDROCHLORIDE 12.5 MG: 25 INJECTION INTRAMUSCULAR; INTRAVENOUS; SUBCUTANEOUS at 16:12

## 2021-10-20 RX ADMIN — HYDROMORPHONE HYDROCHLORIDE 0.2 MG: 0.2 INJECTION, SOLUTION INTRAMUSCULAR; INTRAVENOUS; SUBCUTANEOUS at 19:49

## 2021-10-20 RX ADMIN — GLYCOPYRROLATE 0.8 MG: 0.2 INJECTION, SOLUTION INTRAMUSCULAR; INTRAVENOUS at 15:23

## 2021-10-20 RX ADMIN — OXYCODONE HYDROCHLORIDE 5 MG: 5 TABLET ORAL at 22:00

## 2021-10-20 RX ADMIN — HYDROMORPHONE HYDROCHLORIDE 0.2 MG: 0.2 INJECTION, SOLUTION INTRAMUSCULAR; INTRAVENOUS; SUBCUTANEOUS at 16:19

## 2021-10-20 RX ADMIN — PROPOFOL 180 MG: 10 INJECTION, EMULSION INTRAVENOUS at 13:15

## 2021-10-20 RX ADMIN — KETOROLAC TROMETHAMINE 30 MG: 30 INJECTION, SOLUTION INTRAMUSCULAR; INTRAVENOUS at 21:23

## 2021-10-20 RX ADMIN — ROCURONIUM BROMIDE 20 MG: 50 INJECTION, SOLUTION INTRAVENOUS at 14:03

## 2021-10-20 RX ADMIN — DEXAMETHASONE SODIUM PHOSPHATE 4 MG: 4 INJECTION, SOLUTION INTRA-ARTICULAR; INTRALESIONAL; INTRAMUSCULAR; INTRAVENOUS; SOFT TISSUE at 13:25

## 2021-10-20 RX ADMIN — LIDOCAINE HYDROCHLORIDE 100 MG: 20 INJECTION, SOLUTION INFILTRATION; PERINEURAL at 13:15

## 2021-10-20 RX ADMIN — SODIUM CHLORIDE, POTASSIUM CHLORIDE, SODIUM LACTATE AND CALCIUM CHLORIDE: 600; 310; 30; 20 INJECTION, SOLUTION INTRAVENOUS at 12:06

## 2021-10-20 RX ADMIN — ROCURONIUM BROMIDE 50 MG: 50 INJECTION, SOLUTION INTRAVENOUS at 13:15

## 2021-10-20 RX ADMIN — KETOROLAC TROMETHAMINE 30 MG: 30 INJECTION, SOLUTION INTRAMUSCULAR; INTRAVENOUS at 16:23

## 2021-10-20 RX ADMIN — FAMOTIDINE 20 MG: 10 INJECTION, SOLUTION INTRAVENOUS at 21:23

## 2021-10-20 RX ADMIN — ROCURONIUM BROMIDE 20 MG: 50 INJECTION, SOLUTION INTRAVENOUS at 13:40

## 2021-10-20 RX ADMIN — ONDANSETRON 4 MG: 2 INJECTION INTRAMUSCULAR; INTRAVENOUS at 14:44

## 2021-10-20 RX ADMIN — NEOSTIGMINE METHYLSULFATE 4.5 MG: 1 INJECTION, SOLUTION INTRAVENOUS at 15:23

## 2021-10-20 RX ADMIN — CEFAZOLIN SODIUM 2 G: 2 INJECTION, SOLUTION INTRAVENOUS at 13:07

## 2021-10-20 RX ADMIN — SODIUM CHLORIDE, POTASSIUM CHLORIDE, SODIUM LACTATE AND CALCIUM CHLORIDE: 600; 310; 30; 20 INJECTION, SOLUTION INTRAVENOUS at 13:10

## 2021-10-20 RX ADMIN — ROCURONIUM BROMIDE 10 MG: 50 INJECTION, SOLUTION INTRAVENOUS at 14:49

## 2021-10-20 RX ADMIN — FENTANYL CITRATE 100 MCG: 50 INJECTION, SOLUTION INTRAMUSCULAR; INTRAVENOUS at 13:15

## 2021-10-20 RX ADMIN — HYDROMORPHONE HYDROCHLORIDE 0.5 MG: 1 INJECTION, SOLUTION INTRAMUSCULAR; INTRAVENOUS; SUBCUTANEOUS at 13:40

## 2021-10-20 RX ADMIN — SODIUM CHLORIDE, POTASSIUM CHLORIDE, SODIUM LACTATE AND CALCIUM CHLORIDE: 600; 310; 30; 20 INJECTION, SOLUTION INTRAVENOUS at 14:48

## 2021-10-20 RX ADMIN — MIDAZOLAM 2 MG: 1 INJECTION INTRAMUSCULAR; INTRAVENOUS at 13:10

## 2021-10-20 RX ADMIN — HYDROMORPHONE HYDROCHLORIDE 0.5 MG: 1 INJECTION, SOLUTION INTRAMUSCULAR; INTRAVENOUS; SUBCUTANEOUS at 13:57

## 2021-10-20 RX ADMIN — SODIUM CHLORIDE, POTASSIUM CHLORIDE, SODIUM LACTATE AND CALCIUM CHLORIDE: 600; 310; 30; 20 INJECTION, SOLUTION INTRAVENOUS at 22:01

## 2021-10-20 RX ADMIN — ROCURONIUM BROMIDE 5 MG: 50 INJECTION, SOLUTION INTRAVENOUS at 14:54

## 2021-10-20 RX ADMIN — PROPOFOL 60 MG: 10 INJECTION, EMULSION INTRAVENOUS at 13:39

## 2021-10-20 RX ADMIN — PROCHLORPERAZINE EDISYLATE 10 MG: 5 INJECTION INTRAMUSCULAR; INTRAVENOUS at 22:11

## 2021-10-20 RX ADMIN — ACETAMINOPHEN 975 MG: 325 TABLET, FILM COATED ORAL at 11:51

## 2021-10-20 RX ADMIN — Medication 12 MCG: at 15:02

## 2021-10-20 RX ADMIN — HYDROMORPHONE HYDROCHLORIDE 0.2 MG: 0.2 INJECTION, SOLUTION INTRAMUSCULAR; INTRAVENOUS; SUBCUTANEOUS at 16:37

## 2021-10-20 RX ADMIN — Medication 20 MCG: at 13:22

## 2021-10-20 RX ADMIN — HYDROMORPHONE HYDROCHLORIDE 0.4 MG: 1 INJECTION, SOLUTION INTRAMUSCULAR; INTRAVENOUS; SUBCUTANEOUS at 16:01

## 2021-10-20 RX ADMIN — ONDANSETRON 4 MG: 2 INJECTION INTRAMUSCULAR; INTRAVENOUS at 21:23

## 2021-10-20 RX ADMIN — ROCURONIUM BROMIDE 20 MG: 50 INJECTION, SOLUTION INTRAVENOUS at 14:20

## 2021-10-20 RX ADMIN — PROPOFOL 50 MCG/KG/MIN: 10 INJECTION, EMULSION INTRAVENOUS at 13:16

## 2021-10-20 ASSESSMENT — MIFFLIN-ST. JEOR: SCORE: 1490.78

## 2021-10-20 ASSESSMENT — LIFESTYLE VARIABLES: TOBACCO_USE: 0

## 2021-10-20 NOTE — ANESTHESIA PROCEDURE NOTES
Airway       Patient location during procedure: OR       Procedure Start/Stop Times: 10/20/2021 1:18 PM  Staff -        Performed By: CRNAIndications and Patient Condition       Indications for airway management: ariane-procedural and airway protection       Induction type:intravenous       Mask difficulty assessment: 2 - vent by mask + OA or adjuvant +/- NMBA    Final Airway Details       Final airway type: endotracheal airway       Successful airway: ETT - single  Endotracheal Airway Details        ETT size (mm): 7.0       Cuffed: yes       Successful intubation technique: direct laryngoscopy       DL Blade Type: Butler 2       Grade View of Cords: 1       Position: Right       Measured from: lips       Secured at (cm): 22       Bite block used: None    Post intubation assessment        Placement verified by: capnometry, equal breath sounds and chest rise        Number of attempts at approach: 1       Number of other approaches attempted: 0       Secured with: plastic tape       Ease of procedure: easy       Dentition: Intact and Unchanged

## 2021-10-20 NOTE — ANESTHESIA PREPROCEDURE EVALUATION
"Anesthesia Pre-Procedure Evaluation    Patient: Sonja Morfin   MRN: [id number] : 1960        Preoperative Diagnosis: Uterovaginal prolapse, incomplete [N81.2]  Female stress incontinence [N39.3]    Procedure : Procedure(s):  ROBOTIC ASSISTED SUPRACERVICAL HYSTERECTOMY, SACROCOLPOPEXY  BILATERAL SALPINGECTOMY  ANTERIOR AND POSTERIOR REPAIR,  ADVANTAGE FIT BLADDER SLING, CYSTOSCOPY          Past Medical History:   Diagnosis Date     NO ACTIVE PROBLEMS       Past Surgical History:   Procedure Laterality Date     TONSILLECTOMY        No Known Allergies   Social History     Tobacco Use     Smoking status: Never Smoker     Smokeless tobacco: Never Used   Substance Use Topics     Alcohol use: Yes     Alcohol/week: 2.0 standard drinks     Types: 2 Glasses of wine per week     Comment: 2x/week      Wt Readings from Last 1 Encounters:   10/20/21 89.9 kg (198 lb 3.2 oz)        Anesthesia Evaluation            ROS/MED HX  ENT/Pulmonary:    (-) tobacco use and sleep apnea   Neurologic:       Cardiovascular:       METS/Exercise Tolerance:     Hematologic:       Musculoskeletal: Comment: Recent Left knee surgery, has self-invoked \"limitations\" of knee.  Full range of motion currently.   (+) arthritis,     GI/Hepatic:     (+) GERD,     Renal/Genitourinary:       Endo:     (+) Obesity,     Psychiatric/Substance Use:       Infectious Disease:       Malignancy:       Other:            Physical Exam    Airway        Mallampati: I   TM distance: > 3 FB   Neck ROM: full   Mouth opening: > 3 cm    Respiratory Devices and Support         Dental  no notable dental history         Cardiovascular   cardiovascular exam normal          Pulmonary   pulmonary exam normal                OUTSIDE LABS:  CBC:   Lab Results   Component Value Date    WBC 5.9 10/07/2021    WBC 5.5 2021    HGB 14.7 10/20/2021    HGB 14.6 10/07/2021    HCT 44.7 10/07/2021    HCT 43.1 2021     10/07/2021     2021     BMP:   Lab " Results   Component Value Date     10/07/2021     03/31/2021    POTASSIUM 3.6 10/07/2021    POTASSIUM 3.8 03/31/2021    CHLORIDE 108 10/07/2021    CHLORIDE 108 03/31/2021    CO2 26 10/07/2021    CO2 31 03/31/2021    BUN 15 10/07/2021    BUN 11 03/31/2021    CR 0.93 10/07/2021    CR 0.95 03/31/2021    GLC 87 10/07/2021    GLC 88 03/31/2021     COAGS: No results found for: PTT, INR, FIBR  POC: No results found for: BGM, HCG, HCGS  HEPATIC:   Lab Results   Component Value Date    ALBUMIN 3.8 10/07/2021    PROTTOTAL 7.0 10/07/2021    ALT 31 10/07/2021    AST 42 10/07/2021    ALKPHOS 70 10/07/2021    BILITOTAL 0.6 10/07/2021     OTHER:   Lab Results   Component Value Date    SOLE 9.2 10/07/2021    TSH 3.80 03/31/2021       Anesthesia Plan    ASA Status:  1   NPO Status:  NPO Appropriate    Anesthesia Type: General.     - Airway: ETT   Induction: Intravenous.   Maintenance: Balanced.        Consents    Anesthesia Plan(s) and associated risks, benefits, and realistic alternatives discussed. Questions answered and patient/representative(s) expressed understanding.     - Discussed with:  Patient         Postoperative Care    Pain management: Oral pain medications, IV analgesics.   PONV prophylaxis: Ondansetron (or other 5HT-3), Dexamethasone or Solumedrol, Background Propofol Infusion     Comments:                Germán Arellano MD

## 2021-10-20 NOTE — ANESTHESIA CARE TRANSFER NOTE
Patient: Sonja Morfin    Procedure: Procedure(s):  ROBOTIC ASSISTED SUPRACERVICAL HYSTERECTOMY, SACROCOLPOPEXY  BILATERAL SALPINGECTOMY  ANTERIOR AND POSTERIOR REPAIR,  ADVANTAGE FIT BLADDER SLING, CYSTOSCOPY       Diagnosis: Uterovaginal prolapse, incomplete [N81.2]  Female stress incontinence [N39.3]  Diagnosis Additional Information: No value filed.    Anesthesia Type:   General     Note:    Oropharynx: oropharynx clear of all foreign objects  Level of Consciousness: awake  Oxygen Supplementation: face mask    Independent Airway: airway patency satisfactory and stable  Dentition: dentition unchanged  Vital Signs Stable: post-procedure vital signs reviewed and stable  Report to RN Given: handoff report given  Patient transferred to: PACU    Handoff Report: Identifed the Patient, Identified the Reponsible Provider, Reviewed the pertinent medical history, Discussed the surgical course, Reviewed Intra-OP anesthesia mangement and issues during anesthesia, Set expectations for post-procedure period and Allowed opportunity for questions and acknowledgement of understanding      Vitals:  Vitals Value Taken Time   /67    Temp     Pulse 71    Resp 15 10/20/21 1548   SpO2 97 % 10/20/21 1548   Vitals shown include unvalidated device data.    Electronically Signed By: LUCIA Flores CRNA  October 20, 2021  3:49 PM

## 2021-10-20 NOTE — ANESTHESIA POSTPROCEDURE EVALUATION
Patient: Sonja Morfin    Procedure: Procedure(s):  ROBOTIC ASSISTED SUPRACERVICAL HYSTERECTOMY, SACROCOLPOPEXY  BILATERAL SALPINGECTOMY  ANTERIOR AND POSTERIOR REPAIR,  ADVANTAGE FIT BLADDER SLING, CYSTOSCOPY       Diagnosis:Uterovaginal prolapse, incomplete [N81.2]  Female stress incontinence [N39.3]  Diagnosis Additional Information: No value filed.    Anesthesia Type:  General    Note:  Disposition: Admission   Postop Pain Control: Uneventful            Sign Out: Well controlled pain   PONV: No   Neuro/Psych: Uneventful            Sign Out: Acceptable/Baseline neuro status   Airway/Respiratory: Uneventful            Sign Out: Acceptable/Baseline resp. status   CV/Hemodynamics: Uneventful            Sign Out: Acceptable CV status   Other NRE: NONE   DID A NON-ROUTINE EVENT OCCUR? No           Last vitals:  Vitals Value Taken Time   /87 10/20/21 1630   Temp 36.9  C (98.4  F) 10/20/21 1630   Pulse 70 10/20/21 1643   Resp 12 10/20/21 1643   SpO2 91 % 10/20/21 1643   Vitals shown include unvalidated device data.    Electronically Signed By: Germán Arellano MD  October 20, 2021  4:45 PM

## 2021-10-20 NOTE — BRIEF OP NOTE
[facility] Brief Operative Note    Pre-operative diagnosis: Uterovaginal prolapse, incomplete [N81.2]  Female stress incontinence [N39.3]   Post-operative diagnosis Uterovaginal prolapse, Cystocele, recgocele, stress incontinence     Procedure: Procedure(s):  ROBOTIC ASSISTED SUPRACERVICAL HYSTERECTOMY, SACROCOLPOPEXY  BILATERAL SALPINGECTOMY  ANTERIOR AND POSTERIOR REPAIR,  ADVANTAGE FIT BLADDER SLING, CYSTOSCOPY   Surgeon(s): Surgeon(s) and Role:  Panel 1:     * Shakir Hassan MD - Primary     * Celeste Almodovar PA-C - Assisting  Panel 2:     * Shakir Hassan MD - Primary     * Celeste Almodovar PA-C - Assisting   Estimated blood loss: * No values recorded between 10/20/2021  1:39 PM and 10/20/2021  3:28 PM *    Specimens: ID Type Source Tests Collected by Time Destination   1 : UTERUS AND BILATERAL FALLOPIAN TUBES Tissue Uterus SURGICAL PATHOLOGY EXAM Shakir Hassan MD 10/20/2021  2:40 PM       Findings: Ovaries looked normal and were kept. Sacral colpoepxy supported to  Stage 0 support. Single gore austin in bladder, and we reopened and cut it. Coude tested proved leakage. Ureters open with urojets. Stitch issue resolved with cysto. Sling placed with 10 heger spacer. No trocar entry in space of retzius.

## 2021-10-21 VITALS
OXYGEN SATURATION: 96 % | SYSTOLIC BLOOD PRESSURE: 121 MMHG | TEMPERATURE: 98.7 F | BODY MASS INDEX: 31.85 KG/M2 | WEIGHT: 198.2 LBS | HEIGHT: 66 IN | RESPIRATION RATE: 16 BRPM | DIASTOLIC BLOOD PRESSURE: 62 MMHG | HEART RATE: 71 BPM

## 2021-10-21 LAB — GLUCOSE BLDC GLUCOMTR-MCNC: 103 MG/DL (ref 70–99)

## 2021-10-21 PROCEDURE — 82962 GLUCOSE BLOOD TEST: CPT

## 2021-10-21 PROCEDURE — 250N000011 HC RX IP 250 OP 636: Performed by: OBSTETRICS & GYNECOLOGY

## 2021-10-21 PROCEDURE — 250N000013 HC RX MED GY IP 250 OP 250 PS 637: Performed by: OBSTETRICS & GYNECOLOGY

## 2021-10-21 RX ORDER — OXYCODONE HYDROCHLORIDE 5 MG/1
5-10 TABLET ORAL EVERY 6 HOURS PRN
Qty: 20 TABLET | Refills: 0 | Status: SHIPPED | OUTPATIENT
Start: 2021-10-21 | End: 2021-10-21

## 2021-10-21 RX ORDER — OXYCODONE HYDROCHLORIDE 5 MG/1
5-10 TABLET ORAL EVERY 6 HOURS PRN
Qty: 20 TABLET | Refills: 0 | Status: SHIPPED | OUTPATIENT
Start: 2021-10-21 | End: 2022-01-27

## 2021-10-21 RX ADMIN — HYDROXYZINE HYDROCHLORIDE 25 MG: 25 TABLET, FILM COATED ORAL at 03:16

## 2021-10-21 RX ADMIN — KETOROLAC TROMETHAMINE 30 MG: 30 INJECTION, SOLUTION INTRAMUSCULAR; INTRAVENOUS at 09:04

## 2021-10-21 RX ADMIN — FAMOTIDINE 20 MG: 20 TABLET ORAL at 08:18

## 2021-10-21 RX ADMIN — OXYCODONE HYDROCHLORIDE 5 MG: 5 TABLET ORAL at 08:18

## 2021-10-21 RX ADMIN — KETOROLAC TROMETHAMINE 30 MG: 30 INJECTION, SOLUTION INTRAMUSCULAR; INTRAVENOUS at 03:16

## 2021-10-21 RX ADMIN — OXYCODONE HYDROCHLORIDE 5 MG: 5 TABLET ORAL at 01:58

## 2021-10-21 ASSESSMENT — ENCOUNTER SYMPTOMS
DIETARY ISSUES: ADEQUATE INTAKE
PAIN SEVERITY NOW: MODERATE
SUBJECTIVE PATIENT PAIN CONTROL: REQUIRING PAIN MEDICATION
ACTIVITY IMPAIRMENT: NORMAL WITH ASSISTANCE
SUBJECTIVE PAIN PROGRESSION: IMPROVING

## 2021-10-21 NOTE — PROGRESS NOTES
Shift Summary [id number]    Admitting Diagnosis: Uterovaginal prolapse, incomplete [N81.2]  Female stress incontinence [N39.3]   Vitals WDL - weaned off O2 overnight  Pain 4-7/10. Taking Oxycodone and Atarax PRN.   A&Ox4  Hoffman pulled this am  Mobility - up with SBA and WW previous shift  3 lap sites closed with skin glue and REED. Bruising to all 3 lap sites and bruising mons pubis.   Full liquid diet    Plan: TBD?

## 2021-10-21 NOTE — PLAN OF CARE
Pt discharged home with family. OB saw patient in AM, Hoffman pulled at 0630, requirements to discharge was a PVR of <300. PVR of 205. AVS and prescriptions gone over in room and Questions answered    Elizabeth Newman RN

## 2021-10-21 NOTE — PROGRESS NOTES
"Sonja Morfin is a 61 year old female patient.  1. Stress incontinence in female    2. Uterine prolapse      Past Medical History:   Diagnosis Date     NO ACTIVE PROBLEMS      Current Outpatient Medications   Medication Sig Dispense Refill     acetaminophen (TYLENOL) 325 MG tablet Take 2 tablets (650 mg) by mouth every 4 hours as needed for other (mild pain) 100 tablet 0     hydrOXYzine (ATARAX) 25 MG tablet Take 1 tablet (25 mg) by mouth every 6 hours as needed for itching or anxiety (with pain, moderate pain) 30 tablet 0     ibuprofen (ADVIL/MOTRIN) 600 MG tablet Take 1 tablet (600 mg) by mouth every 6 hours as needed (mild) 30 tablet 0     ondansetron (ZOFRAN-ODT) 4 MG ODT tab Take 1-2 tablets (4-8 mg) by mouth every 8 hours as needed for nausea Dissolve ON the tongue. 10 tablet 0     oxyCODONE (ROXICODONE) 5 MG tablet Take 1-2 tablets (5-10 mg) by mouth every 6 hours as needed for pain (Moderate to Severe) 12 tablet 0     No Known Allergies  Active Problems:    * No active hospital problems. *    Blood pressure 138/74, pulse 85, temperature 98.3  F (36.8  C), temperature source Oral, resp. rate 16, height 1.676 m (5' 6\"), weight 89.9 kg (198 lb 3.2 oz), SpO2 95 %, not currently breastfeeding.    Subjective:  Symptoms:  Stable.    Diet:  Adequate intake.    Activity level: Normal with assistance.    Pain:  She complains of pain that is moderate.  She reports pain is improving.  Pain is requiring pain medication.      Objective:  General Appearance:  Comfortable and well-appearing.    Vital signs: (most recent): Blood pressure 121/62, pulse 71, temperature 98.7  F (37.1  C), temperature source Oral, resp. rate 16, height 1.676 m (5' 6\"), weight 89.9 kg (198 lb 3.2 oz), SpO2 96 %, not currently breastfeeding.  Vital signs are normal.    Output: No stool output.  Urine output assessment: diaz discontinued.  waiting for post void residual.    Lungs:  Normal effort and normal respiratory rate.    Heart: Normal rate.  "   Abdomen: Abdomen is soft.  (Incision sites healing well.  Bruising surrounding incisions).  There is generalized tenderness.  There is incisional tenderness.    Extremities: (No LE edema)  Neurological: Patient is alert.    Skin:  Warm.      Assessment:    Condition: In stable condition (POD1 Sacrocolpopexy, Anterior and posterior vaginal repair, bladder sling).  Improving.       Plan:   Discharge home (if passes post-void residual, d/c home.  if doesn't pass d/c home with diaz catheter).  Encourage ambulation.  Advance diet as tolerated.  Resume home regimen and administer medications as ordered.   (Discharge home with diaz if doesn't pass postvoid residual.  If discharge home with diaz, follow-up in clinic early next week with Dr. Hassan for voiding trial.  Scheduled between 9-10am  If no diaz at discharge, follow-up at scheduled postop visit).       Celeste Almodovar PA-C  10/21/2021

## 2021-10-21 NOTE — PLAN OF CARE
~ Patient vital signs are at baseline, - met  ~ Patient able to ambulate as they were prior to admission or with assist devices provided by therapies during their stay. - met assist 1   ~ Patient MUST void prior to discharge, - pending, Hoffman in place, pink earl urine toward end of shift   ~ Patient able to tolerate oral intake to maintain hydration status, - pending, only had crackers and water tonight, intermittent nausea.  ~ Patient has adequate pain control using Oral analgesics, - met    ~ Hypercapnia, hypoventilation or hypoxia resolved for at least 2 hours without supplemental oxygen, - 1L O2 for overnight  ~ Deficits in sensation, mobility or coordination have resolved if spinal or regional anesthesia was used, - met, mabulated in marcos   ~ Patient has returned to baseline mental status, - met

## 2021-10-22 PROCEDURE — 88305 TISSUE EXAM BY PATHOLOGIST: CPT | Mod: 26 | Performed by: PATHOLOGY

## 2021-10-24 ENCOUNTER — HEALTH MAINTENANCE LETTER (OUTPATIENT)
Age: 61
End: 2021-10-24

## 2021-10-26 NOTE — OP NOTE
Procedure Date: 10/20/2021    PREOPERATIVE DIAGNOSES:  1.  Uterovaginal prolapse -- incomplete.  2.  Female stress urinary incontinence.  3.  Cystocele.  4.  Rectocele.    POSTOPERATIVE DIAGNOSES:    1.  Uterovaginal prolapse -- incomplete.  2.  Female stress urinary incontinence.  3.  Cystocele.  4.  Rectocele.    PROCEDURES:     1.  Robotic-assisted supracervical hysterectomy.  2.  Bilateral salpingectomy.  3.  Sacral colpopexy.  4.  Anterior and posterior repair.  5.  Cystoscopy.  6.  Advantage Fit suburethral sling.    SURGEON:  Shakir Hassan MD    ASSISTANT:  Celeste Almodovar PA-C    ANESTHESIA:  General.    ESTIMATED BLOOD LOSS:  50 mL.    COMPLICATIONS:  None.    SPECIMENS:  Supracervical uterus with bilateral fallopian tubes.    FINDINGS:  The patient had normal-appearing ovaries, so as per our discussion we decided to keep those.  The rest the uterus was removed without incident and the sacrocolpopexy mesh was supported with 6 cm anteriorly and 8 cm posteriorly.  We inspected below and saw a small amount of blood in the Hoffman catheter tubing.  Cystoscopy showed a single Collins-Darío stitch from our distal-most mesh attachment that had bound the bladder epithelium.  We then entered the abdomen and used a robotic device to cut that Collins-Darío and release the stitch.    Second-look cystoscopy showed that the issue was corrected.  The UroJets were seen on the right and left, and she failed a coude test.  A suburethral sling was placed with a 10 mm Hegar dilator as a spacer.  There was no trocar entry in the space of Retzius.    DESCRIPTION OF OPERATIVE PROCEDURE:  After obtaining informed consent, the patient was prepped and draped in the usual manner for an abdominal vaginal procedure.  We created a 3 cm midline umbilical skin incision and carried the dissection through the fascia.  The fascia was then nicked, undermined with Foster scissors, and extended superiorly and inferiorly.  We entered the parietal peritoneum  bluntly with a finger and extended the incision until we could comfortably fit the GelPOINT wound retractor.  The GelPOINT cap was then placed with the trocars pre-inserted.  Pneumoperitoneum was created to 15.  Under direct visualization with the camera, we placed bilateral da Olegario ports to the right and left.  The patient was then placed in 29 degrees Trendelenburg and the da Olegario robot was docked.  I then took my place at the operative console.    Using a vessel sealer, we sealed and cut the left fallopian tube free from the superior pole attachment to the ovary.  We then extended the incision through the mesosalpinx down to the cornua.  Next, we grabbed the left utero-ovarian ligament and this was sealed and cut with the vessel sealer.  We grasped the middle part of the round ligament, and this too was sealed and cut in the usual manner.  We were then in the broad ligament, which was  into anterior and posterior leaves.  We cut the posterior leaf down to the left uterosacral ligament.  The anterior broad ligament was cut with the scissors, and we came across the vesicouterine peritoneal fold.  This skeletonized the vasculature on the left side, which was sealed and cut with the vessel sealer at the cervical isthmus.  The bladder was peeled down using blunt dissection.  I did a similar dissection on the patient's right side, and without difficulty.  At this point, the specimen was devitalized down to the cervical isthmus and we cut cross that point, using the scissors to scallop out our supracervical hysterectomy specimen.  This was placed in the upper abdomen.  We then closed the scalloped edges of the cervical stump with a dinah suture cut and then 0 Stratafix.  After this area was closed, we finished doing blunt and sharp dissection of the anterior pocket that would act as our cystocele repair.  We peeled it down effectively 6-7 cm.  Posteriorly, we cut the peritoneum overlying the posterior wall of  the vagina between the uterosacral ligaments, and I used blunt and sharp dissection to create a pocket that we took down to the rectal verge approximately 8 cm.  At this point, we turned our attention to the sacral promontory.  The retroperitoneum was cut overlying the vertebral body of L5, and we did blunt and sharp dissection until we were down upon the anterior longitudinal ligament.  We tunneled out with the vessel sealer, coming out in the distribution of the right uterosacral ligament close to the cervical stump, and I cut a hole in the retroperitoneum there to complete our peritoneal tunnel that would later be used for the mesh strap.  Next, we placed the Artisyn mesh that was precut into position, and I sewed into place with 6 simple interrupted stitches of 2-0 Houston-Darío anteriorly and 5 simple interrupted stitches of Houston-Darío posteriorly.  We pulled the mesh strap through the peritoneal tunnel and under appropriate tension, it was sewed to the anterior longitudinal ligament overlying the vertebral body of L5 with 2 simple interrupted stitches.  Excess mesh was cut at the promontory.  We closed the peritoneum there with 4-0 Vicryl in a figure-of-eight stitch and the lower field peritoneum was closed with 3-0 Stratafix in a running stitch.  I then closed the umbilicus with 0 looped Maxon and took my place at the lower field.    I could see there was a little bit of blood tinge in the bladder, so the Hoffman catheter was removed.  We placed a 30-degree angle cystoscope and could see that one of the Houston-Darío stitches that was placed in the most distal portion of the anterior mesh application had bound the transitional cell epithelium.  So I scrubbed out of the case and scrubbed back in.  We cut the 0 looped Maxon stitching of the fascia and replaced the GelPOINT wound retractor and GelPOINT cap.  Pneumoperitoneum was created, and we replaced the da Olegario ports to the right and left.  The patient was placed in 29  degrees Trendelenburg and the da Olegario robot was redocked.  I cut the 3-0 Stratafix in the lower field and peeled back the bladder flap until I could see the distal-most [place]-Darío stitch.  This was cut with the hot heidi and the stitch was removed from the field.  I then reclosed the peritoneum with 3-0 Stratafix once again.  Pneumoperitoneum was let down.  Instrumentation was removed.  GelPOINT was removed, and we closed the fascia once again with 0 looped Maxon in a running stitch.  Skin was closed with 4-0 Vicryl in subcuticular stitches.  The wounds were sealed with Dermabond.    I took my place at the lower field.  I did a coude test where I pushed on the patient's belly and we easily saw the ejection of urine, indicating she was still stress incontinent.  I grasped the midurethral fold with an Allis clamp and the anterior vaginal wall with an Allis clamp, and we hydrodissected with lidocaine and epinephrine in the suburethral space out to the symphysis pubis to the right and left.  I used a 15 blade scalpel to create a 1 cm incision.  We used blunt and sharp dissection with the Metzenbaum scissors to start our initial track out to the symphysis pubis and to avoid the ureter.  The Advantage Fit sling trocar was then affixed with the mesh inserted, coming out 2 cm from midline to the right and left at the mons pubis.  I then did cystoscopy, which confirmed no trocar entry in the space of Retzius and that the bladder distortion on the vaginal facing wall had been relieved with the removal of the single [place]-Darío stitch.  UroJets were seen.  At this point, we pulled the rest of the mesh through at the mons pubis and then used a 10 mm Hegar dilator as a suburethral spacer.  The blue tape was cut in the vagina to release the mesh, and it was pulled through the mons pubis to deploy it.  We then closed the vaginal epithelium with 2-0 Vicryl in a running, locking stitch.  Excess mesh was cut at the mons pubis and those  ports were sealed with Exofin dermal adhesive.  We reinserted a Hoffman catheter and got clear efflux.  At this point, the procedure was complete.  Needle, sponge, and instrument counts were correct.  The patient tolerated the procedure well.    DISPOSITION:  The patient is in satisfactory, stable condition and is in recovery.    Shakir Hassan MD        D: 10/26/2021   T: 10/26/2021   MT: CADEN    Name:     RODOLFO SHI  MRN:      [id number]        Account:        [id number]   :      1960           Procedure Date: 10/20/2021     Document: [id number]

## 2021-11-12 ENCOUNTER — TELEPHONE (OUTPATIENT)
Dept: VASCULAR SURGERY | Facility: CLINIC | Age: 61
End: 2021-11-12
Payer: COMMERCIAL

## 2021-11-12 NOTE — TELEPHONE ENCOUNTER
Called patient and spoke with her regarding post sclerotherapy activity restrictions. Told the patient that we encourage walking following the injections. The activity restrictions would include no running, jogging, high impact aerobic exercises, or lifting anything heavier than 25 lbs for 7 days. After 7 days there is no activity restrictions. Patient was in agreement and will plan to keep her appointment for 11/29.

## 2021-11-23 ENCOUNTER — TELEPHONE (OUTPATIENT)
Dept: VASCULAR SURGERY | Facility: CLINIC | Age: 61
End: 2021-11-23
Payer: COMMERCIAL

## 2021-11-23 NOTE — TELEPHONE ENCOUNTER
Called pt back and she states she takes Tumeric for her knee. She is asking if she needs to stop medication for sclerotherapy. Pt told she can stay on Tumeric. Pt verbalized understanding.   Abimael Lindquist RN

## 2021-11-23 NOTE — TELEPHONE ENCOUNTER
Pt is scheduled to have her 1st sclero with CPN on 11-29-21 and has a question regarding a medication she takes for a bad knee.

## 2021-11-29 ENCOUNTER — OFFICE VISIT (OUTPATIENT)
Dept: VASCULAR SURGERY | Facility: CLINIC | Age: 61
End: 2021-11-29

## 2021-11-29 DIAGNOSIS — I78.1 SPIDER TELANGIECTASIS OF SKIN: ICD-10-CM

## 2021-11-29 DIAGNOSIS — I78.1 SPIDER VEINS: Primary | ICD-10-CM

## 2021-11-29 PROCEDURE — A6533 GC STOCKING THIGHLNGTH 18-30: HCPCS

## 2021-11-29 PROCEDURE — 36468 NJX SCLRSNT SPIDER VEINS: CPT | Performed by: SURGERY

## 2021-11-29 PROCEDURE — 99207 PR NO CHARGE NURSE ONLY: CPT

## 2021-11-29 PROCEDURE — S9999 SALES TAX: HCPCS | Performed by: SURGERY

## 2021-11-29 NOTE — PROGRESS NOTES
Vein Clinic Sclerotherapy Note     Indications:  Bilateral lower extremity spider telangiectasias and reticular veins of cosmetic concern     Procedure:  Bilateral lower extremity cosmetic sclerotherapy     Procedure description  Details of procedure including risks of allergic reaction, deep vein thrombosis, ulceration, superficial thrombophlebitis and hyperpigmentation were discussed.  The patient voiced understanding and wished to proceed.  Informed consent was obtained.    I donned the Syris headlight and injected telangiectasias which were most numerous over the right anterior, lateral and posterior lateral thigh as well as on the left thigh, lateral legs, left greater than right, and the popliteal spaces bilaterally using 0.5% polidocanol foam.  Used a total of 4 syringes of foam.  I had her perform ankle pumps.    There were a few reticular veins in the right popliteal fossa greater than left popliteal fossa which were not treated but could be treated at a subsequent visit.  We cleaned her legs, had her don thigh-high compression, then had her walk for 10 minutes.  The patient tolerated procedure well without evidence of allergic reaction of complications and will return in 6 weeks in follow-up.    Sclerotherapy    Date/Time: 11/29/2021 2:52 PM  Performed by: Fabrice Collazo MD  Authorized by: Fabrice Collazo MD     Time out: Immediately prior to the procedure a time out was called    Type:  Cosmetic  Session:  Full  Procedure side:  Bilateral  Solution/Amount:  .5 POLIDOCANOL  Syringes::  4  Patient tolerance:  Patient tolerated the procedure well with no immediate complications  Wrap/Hose:  Michael Collazo MD    Dictated using Dragon voice recognition software which may result in transcription errors

## 2021-11-29 NOTE — PROGRESS NOTES
Patient purchased one pair(s) of black, thigh high, open-toe, size 4 compression hose from the clinic today.     Informed patient all compression hose purchases are final.    Rola Garay RN on 11/29/2021 at 2:19 PM

## 2021-11-29 NOTE — LETTER
11/29/2021         RE: Sonja Morfin  [address] [facility] [address]        Dear Colleague,    Thank you for referring your patient, Sonja Morfin, to the [facility] [place]. Please see a copy of my visit note below.        Vein Clinic Sclerotherapy Note     Indications:  Bilateral lower extremity spider telangiectasias and reticular veins of cosmetic concern     Procedure:  Bilateral lower extremity cosmetic sclerotherapy     Procedure description  Details of procedure including risks of allergic reaction, deep vein thrombosis, ulceration, superficial thrombophlebitis and hyperpigmentation were discussed.  The patient voiced understanding and wished to proceed.  Informed consent was obtained.    I donned the Syris headlight and injected telangiectasias which were most numerous over the right anterior, lateral and posterior lateral thigh as well as on the left thigh, lateral legs, left greater than right, and the popliteal spaces bilaterally using 0.5% polidocanol foam.  Used a total of 4 syringes of foam.  I had her perform ankle pumps.    There were a few reticular veins in the right popliteal fossa greater than left popliteal fossa which were not treated but could be treated at a subsequent visit.  We cleaned her legs, had her don thigh-high compression, then had her walk for 10 minutes.  The patient tolerated procedure well without evidence of allergic reaction of complications and will return in 6 weeks in follow-up.    Sclerotherapy    Date/Time: 11/29/2021 2:52 PM  Performed by: Fabrice Collazo MD  Authorized by: Fabrice Collazo MD     Time out: Immediately prior to the procedure a time out was called    Type:  Cosmetic  Session:  Full  Procedure side:  Bilateral  Solution/Amount:  .5 POLIDOCANOL  Syringes::  4  Patient tolerance:  Patient tolerated the procedure well with no immediate complications  Wrap/Hose:  Michael Collazo,  MD    Dictated using Dragon voice recognition software which may result in transcription errors      Again, thank you for allowing me to participate in the care of your patient.        Sincerely,        Fabrice Collazo MD

## 2021-12-01 ENCOUNTER — ANCILLARY PROCEDURE (OUTPATIENT)
Dept: BONE DENSITY | Facility: CLINIC | Age: 61
End: 2021-12-01
Attending: INTERNAL MEDICINE
Payer: COMMERCIAL

## 2021-12-01 ENCOUNTER — OFFICE VISIT (OUTPATIENT)
Dept: AUDIOLOGY | Facility: CLINIC | Age: 61
End: 2021-12-01
Attending: INTERNAL MEDICINE
Payer: COMMERCIAL

## 2021-12-01 DIAGNOSIS — Z78.0 MENOPAUSE: ICD-10-CM

## 2021-12-01 DIAGNOSIS — H90.3 SENSORINEURAL HEARING LOSS, ASYMMETRICAL: Primary | ICD-10-CM

## 2021-12-01 DIAGNOSIS — H91.90 DECREASED HEARING, UNSPECIFIED LATERALITY: ICD-10-CM

## 2021-12-01 PROCEDURE — 92557 COMPREHENSIVE HEARING TEST: CPT | Performed by: AUDIOLOGIST

## 2021-12-01 PROCEDURE — 77080 DXA BONE DENSITY AXIAL: CPT | Performed by: INTERNAL MEDICINE

## 2021-12-01 PROCEDURE — 92550 TYMPANOMETRY & REFLEX THRESH: CPT | Performed by: AUDIOLOGIST

## 2021-12-01 NOTE — PROGRESS NOTES
AUDIOLOGY REPORT    SUBJECTIVE:  Sonja Morfin is a 61 year old female who was seen in the Audiology Clinic at the [facility] for audiologic evaluation, referred by Danyell Pearson M.D.. The patient reports a possible decrease in hearing status bilaterally. She notes that her father has a history of hearing loss. She reports that she previously had a baseline hearing evaluation at another clinic and reports that there may have been a slight hearing loss at a few pitches at that time. She feels she hears about the same in both ears. She reports bilateral tinnitus that is really only noticeable when she pays attention to it. The patient denies  bilateral otalgia, bilateral drainage, bilateral aural fullness and history of ear surgeries.  The patient notes difficulty with communication in a variety of listening situations.      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    OBJECTIVE:  Abuse Screening:  Do you feel unsafe at home or work/school? No  Do you feel threatened by someone? No  Does anyone try to keep you from having contact with others, or doing things outside of your home? No  Physical signs of abuse present? No     Fall Risk Screen:  1. Have you fallen two or more times in the past year? No  2. Have you fallen and had an injury in the past year? No    Timed Up and Go Score (in seconds): not tested  Is patient a fall risk? No  Referral initiated: No  Fall Risk Assessment Completed by Audiology    Otoscopic exam indicates ears are clear of cerumen bilaterally     Pure Tone Thresholds assessed using conventional audiometry with good reliability from 250-8000 Hz bilaterally using insert earphones     RIGHT: Largely normal hearing with a mild likely sensorineural hearing loss at 8 kHz only    LEFT:    Normal hearing through 4 kHz sloping to moderate sensorineural hearing loss    Tympanogram:    RIGHT: normal  eardrum mobility    LEFT:   normal eardrum mobility    Reflexes (reported by stimulus ear):  RIGHT: Ipsilateral is present at normal levels  RIGHT: Contralateral is present at normal levels  LEFT:   Ipsilateral is present at normal levels  LEFT:   Contralateral is present at normal levels    Speech Reception Threshold:    RIGHT: 15 dB HL    LEFT:   10 dB HL    Word Recognition Score:     RIGHT: 100% at 50 dB HL using NU-6 recorded word list.    LEFT:   100% at 50 dB HL using NU-6 recorded word list.      ASSESSMENT:    Today's results revealed a bilateral high frequency asymmetric hearing loss. Today s results were discussed with the patient in detail.     PLAN:  Patient was counseled regarding hearing loss and impact on communication.  It was reviewed that while she has a hearing loss, she is not an ideal candidate for hearing aids at this time and she expressed understanding.  A follow-up with ENT regarding the asymmetry was discussed and recommended, however the patient expressed that due to insurance costs, unless the ENT appointment could be before the end of the year - she would prefer not to follow-up with them at this time and return to monitor her hearing. An attempt was made with scheduling to get her an appointment before the end of the year, but ultimately she did not schedule anything. It is recommended that the patient continue to monitor her hearing status every 1-2 years, sooner if concerns arise. Please call this clinic with questions regarding these results or recommendations.        Yael Noriega, Fern  Audiologist  [place] License  #[id number]

## 2021-12-23 ENCOUNTER — TELEPHONE (OUTPATIENT)
Dept: VASCULAR SURGERY | Facility: CLINIC | Age: 61
End: 2021-12-23
Payer: COMMERCIAL

## 2021-12-23 NOTE — TELEPHONE ENCOUNTER
"    Vein Clinic - Nurse Triage Call    Situation: Pt calling c/o \"several areas, one larger area on my left inner shin that is hard, hurts to the touch-worse than before but not severe.\"      Background: Pt had vein procedure: first session $ sclero 11/29/21 by CPN.    Assessment: Patient reports rope like areas that are tender and darker in appearance. More tender after doing physical therapy for her knee. Patient stated \" I do have a more severe case of spider veins.\" Patient went on to say she was unsure if she wanted her second session because she was not satisfied with her first session.     Recommendation: Encouraged patient to pay particular attention if the discomfort is only after therapy. Reassured patient that her symptoms sound like she may have trapped blood. Asked is she would like to be seen prior to her second appointment for evaluation. At that time she could further discuss with Dr. Collazo if it would be appropriate to proceed with second session. I did reiterate that if she does have a \"severe case of spider veins\" that more treatments maybe needed in order to see the results she is hoping for.     Scheduled patient for a 15 minute visit Monday 12/27/21 to be evaluated.       Pastora Rutledge RN    "

## 2021-12-27 ENCOUNTER — OFFICE VISIT (OUTPATIENT)
Dept: VASCULAR SURGERY | Facility: CLINIC | Age: 61
End: 2021-12-27
Payer: COMMERCIAL

## 2021-12-27 DIAGNOSIS — I78.1 SPIDER TELANGIECTASIS OF SKIN: Primary | ICD-10-CM

## 2021-12-27 PROCEDURE — 99207 PR VEINSOLUTIONS NO CHARGE VISIT: CPT | Performed by: SURGERY

## 2021-12-27 NOTE — LETTER
12/27/2021         RE: Sonja Morfin  [address]  [place] [address]        Dear Colleague,    Thank you for referring your patient, Sonja Morfin, to the [facility] [place]. Please see a copy of my visit note below.    [facility] office note  Sonja Morfin is 4 weeks status post bilateral extremity cosmetic sclerotherapy. She returns with concerns of trapped blood within some of the treated telangiectasias.    Exam  There is trapped blood scattered in several areas of previously treated veins on the lateral thighs and legs bilaterally. There are a few areas of mild hyperpigmentation.    We reviewed her preoperative photos and note that there has been improvement in several of the areas that were injected. The anterolateral left leg did not appear to have responded quite as well based on the photos, however.    I cleaned the areas of trapped blood with alcohol, made stab wound with an 18-gauge needle and expressed thrombus.    Assessment  Overall she is recovering satisfactorily. She had rather proliferative telangiectasias and I explained that the trap was not uncommon. It was clear that she expected to see more improvement than she has seen today. I reiterated to her our discussions previously which stated that 2-4 sessions of sclerotherapy would be required to achieve a 75 to 80% reduction in the appearance of the telangiectasias.    I showed her my initial consultation with the after visit summary stating 2-4 sessions of cosmetic sclerotherapy may be necessary. I have reassured her that she has several weeks of healing from the previous session and that per our discussion, she should not charge results based on the status following 1 treatment.    Plan  Return for additional cosmetic sclerotherapy if desired.    POWER Collazo MD    Dictated using Dragon voice recognition software which may result in transcription errors            Again, thank you for  allowing me to participate in the care of your patient.        Sincerely,        Fabrice Collazo MD

## 2021-12-27 NOTE — PROGRESS NOTES
[facility] [place] office note  Sonja Morfin is 4 weeks status post bilateral extremity cosmetic sclerotherapy. She returns with concerns of trapped blood within some of the treated telangiectasias.    Exam  There is trapped blood scattered in several areas of previously treated veins on the lateral thighs and legs bilaterally. There are a few areas of mild hyperpigmentation.    We reviewed her preoperative photos and note that there has been improvement in several of the areas that were injected. The anterolateral left leg did not appear to have responded quite as well based on the photos, however.    I cleaned the areas of trapped blood with alcohol, made stab wound with an 18-gauge needle and expressed thrombus.    Assessment  Overall she is recovering satisfactorily. She had rather proliferative telangiectasias and I explained that the trap was not uncommon. It was clear that she expected to see more improvement than she has seen today. I reiterated to her our discussions previously which stated that 2-4 sessions of sclerotherapy would be required to achieve a 75 to 80% reduction in the appearance of the telangiectasias.    I showed her my initial consultation with the after visit summary stating 2-4 sessions of cosmetic sclerotherapy may be necessary. I have reassured her that she has several weeks of healing from the previous session and that per our discussion, she should not charge results based on the status following 1 treatment.    Plan  Return for additional cosmetic sclerotherapy if desired.    POWER Collazo MD    Dictated using Dragon voice recognition software which may result in transcription errors

## 2022-01-06 ENCOUNTER — TELEPHONE (OUTPATIENT)
Dept: VASCULAR SURGERY | Facility: CLINIC | Age: 62
End: 2022-01-06
Payer: COMMERCIAL

## 2022-01-06 NOTE — TELEPHONE ENCOUNTER
Spoke with patient regarding area on left median calf that trapped blood was removed 12/27/21. She states it is not painful but she has a hard lump and bruising. Told pt this is normal course for healing.  Pt will call back in 4 weeks or sooner if still concerned regarding area.  Abimael Lindquist RN

## 2022-01-27 ENCOUNTER — OFFICE VISIT (OUTPATIENT)
Dept: VASCULAR SURGERY | Facility: CLINIC | Age: 62
End: 2022-01-27
Payer: COMMERCIAL

## 2022-01-27 DIAGNOSIS — I78.1 SPIDER TELANGIECTASIS OF SKIN: Primary | ICD-10-CM

## 2022-01-27 PROCEDURE — 99207 PR VEINSOLUTIONS NO CHARGE VISIT: CPT | Performed by: SURGERY

## 2022-01-27 NOTE — PROGRESS NOTES
"[facility] [place] office note  Sonja Morfin returns in follow-up of a single session of cosmetic sclerotherapy on 11/29/2021.  I have seen her on 2 occasions now in her recovery..  Initially she was concerned about trapped blood that we treated and removed.  She now returns with a persistent \"lump\" on the distal medial left leg.  She is concerned that this has not dissipated.    Exam  Scattered telangiectasias most numerous over the distal medial right thigh with few scattered on her left lateral thighs.  There are also scattered telangiectasias over her legs, especially on the lateral mid to distal left leg.    Area of concern for her is a small area of trapped blood on the distal medial left leg.  We cleaned this area with alcohol, made a stab wound with an 18-gauge needle and expressed thrombus.  I taped (Medipore tape) a cotton ball over this area.    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  The patient has not been impressed with her improvement.  Apparently, either I did not explain the fact that she would need several sessions of sclerotherapy or she simply did not hear my explanation.  She voiced understanding today.    Plan  Return on an as-needed basis for the sclerotherapy.  Certainly, feel we can make progress but with full understanding that 1-3 sessions will be necessary to achieve significant improvement.    POWER Collazo MD    Dictated using Dragon voice recognition software which may result in transcription errors  "

## 2022-01-27 NOTE — LETTER
"    1/27/2022         RE: Sonja Morfin  [address]  [facility] [address]        Dear Colleague,    Thank you for referring your patient, Sonja Morfin, to the [facility] [place]. Please see a copy of my visit note below.    [facility] office note  Sonja Morfin returns in follow-up of a single session of cosmetic sclerotherapy on 11/29/2021.  I have seen her on 2 occasions now in her recovery..  Initially she was concerned about trapped blood that we treated and removed.  She now returns with a persistent \"lump\" on the distal medial left leg.  She is concerned that this has not dissipated.    Exam  Scattered telangiectasias most numerous over the distal medial right thigh with few scattered on her left lateral thighs.  There are also scattered telangiectasias over her legs, especially on the lateral mid to distal left leg.    Area of concern for her is a small area of trapped blood on the distal medial left leg.  We cleaned this area with alcohol, made a stab wound with an 18-gauge needle and expressed thrombus.  I taped (Medipore tape) a cotton ball over this area.    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  The patient has not been impressed with her improvement.  Apparently, either I did not explain the fact that she would need several sessions of sclerotherapy or she simply did not hear my explanation.  She voiced understanding today.    Plan  Return on an as-needed basis for the sclerotherapy.  Certainly, feel we can make progress but with full understanding that 1-3 sessions will be necessary to achieve significant improvement.    POWER Collazo MD    Dictated using Dragon voice recognition software which may result in transcription errors      Again, thank you for allowing me to participate in the care of your patient.        Sincerely,        Fabrice Collazo MD    "

## 2022-06-05 ENCOUNTER — HEALTH MAINTENANCE LETTER (OUTPATIENT)
Age: 62
End: 2022-06-05

## 2022-06-28 ENCOUNTER — LAB REQUISITION (OUTPATIENT)
Dept: LAB | Facility: CLINIC | Age: 62
End: 2022-06-28
Payer: COMMERCIAL

## 2022-06-28 DIAGNOSIS — L65.0 TELOGEN EFFLUVIUM: ICD-10-CM

## 2022-06-28 LAB
BASOPHILS # BLD AUTO: 0.1 10E3/UL (ref 0–0.2)
BASOPHILS NFR BLD AUTO: 1 %
EOSINOPHIL # BLD AUTO: 0.1 10E3/UL (ref 0–0.7)
EOSINOPHIL NFR BLD AUTO: 3 %
ERYTHROCYTE [DISTWIDTH] IN BLOOD BY AUTOMATED COUNT: 13.1 % (ref 10–15)
FERRITIN SERPL-MCNC: 44 NG/ML (ref 8–252)
HCT VFR BLD AUTO: 44 % (ref 35–47)
HGB BLD-MCNC: 14.2 G/DL (ref 11.7–15.7)
IMM GRANULOCYTES # BLD: 0 10E3/UL
IMM GRANULOCYTES NFR BLD: 0 %
IRON SATN MFR SERPL: 31 % (ref 15–46)
IRON SERPL-MCNC: 88 UG/DL (ref 35–180)
LYMPHOCYTES # BLD AUTO: 2.6 10E3/UL (ref 0.8–5.3)
LYMPHOCYTES NFR BLD AUTO: 46 %
MCH RBC QN AUTO: 32.1 PG (ref 26.5–33)
MCHC RBC AUTO-ENTMCNC: 32.3 G/DL (ref 31.5–36.5)
MCV RBC AUTO: 99 FL (ref 78–100)
MONOCYTES # BLD AUTO: 0.4 10E3/UL (ref 0–1.3)
MONOCYTES NFR BLD AUTO: 8 %
NEUTROPHILS # BLD AUTO: 2.3 10E3/UL (ref 1.6–8.3)
NEUTROPHILS NFR BLD AUTO: 42 %
NRBC # BLD AUTO: 0 10E3/UL
NRBC BLD AUTO-RTO: 0 /100
PLATELET # BLD AUTO: 239 10E3/UL (ref 150–450)
RBC # BLD AUTO: 4.43 10E6/UL (ref 3.8–5.2)
T3 SERPL-MCNC: 68 NG/DL (ref 85–202)
T4 FREE SERPL-MCNC: 0.75 NG/DL (ref 0.76–1.46)
TIBC SERPL-MCNC: 288 UG/DL (ref 240–430)
TSH SERPL DL<=0.005 MIU/L-ACNC: 2.31 MU/L (ref 0.4–4)
WBC # BLD AUTO: 5.6 10E3/UL (ref 4–11)

## 2022-06-28 PROCEDURE — 84480 ASSAY TRIIODOTHYRONINE (T3): CPT | Mod: ORL | Performed by: DERMATOLOGY

## 2022-06-28 PROCEDURE — 84403 ASSAY OF TOTAL TESTOSTERONE: CPT | Mod: ORL | Performed by: DERMATOLOGY

## 2022-06-28 PROCEDURE — 82306 VITAMIN D 25 HYDROXY: CPT | Mod: ORL | Performed by: DERMATOLOGY

## 2022-06-28 PROCEDURE — 84270 ASSAY OF SEX HORMONE GLOBUL: CPT | Mod: ORL | Performed by: DERMATOLOGY

## 2022-06-28 PROCEDURE — 83550 IRON BINDING TEST: CPT | Mod: ORL | Performed by: DERMATOLOGY

## 2022-06-28 PROCEDURE — 84439 ASSAY OF FREE THYROXINE: CPT | Mod: ORL | Performed by: DERMATOLOGY

## 2022-06-28 PROCEDURE — 84443 ASSAY THYROID STIM HORMONE: CPT | Mod: ORL | Performed by: DERMATOLOGY

## 2022-06-28 PROCEDURE — 36415 COLL VENOUS BLD VENIPUNCTURE: CPT | Mod: ORL | Performed by: DERMATOLOGY

## 2022-06-28 PROCEDURE — 85025 COMPLETE CBC W/AUTO DIFF WBC: CPT | Mod: ORL | Performed by: DERMATOLOGY

## 2022-06-28 PROCEDURE — 82728 ASSAY OF FERRITIN: CPT | Mod: ORL | Performed by: DERMATOLOGY

## 2022-06-28 PROCEDURE — 82627 DEHYDROEPIANDROSTERONE: CPT | Mod: ORL | Performed by: DERMATOLOGY

## 2022-06-29 LAB
DEPRECATED CALCIDIOL+CALCIFEROL SERPL-MC: 44 UG/L (ref 20–75)
DHEA-S SERPL-MCNC: 40 UG/DL (ref 35–430)
SHBG SERPL-SCNC: 87 NMOL/L (ref 30–135)

## 2022-06-30 LAB — TESTOST SERPL-MCNC: 5 NG/DL (ref 8–60)

## 2022-08-26 ASSESSMENT — ENCOUNTER SYMPTOMS
HEMATURIA: 0
HEADACHES: 0
BREAST MASS: 0
FEVER: 0
HEMATOCHEZIA: 0
SORE THROAT: 0
DYSURIA: 0
DIARRHEA: 0
ARTHRALGIAS: 0
NAUSEA: 0
PARESTHESIAS: 0
CHILLS: 0
HEARTBURN: 0
CONSTIPATION: 0
MYALGIAS: 0
SHORTNESS OF BREATH: 0
PALPITATIONS: 0
FREQUENCY: 0
DIZZINESS: 0
ABDOMINAL PAIN: 0
NERVOUS/ANXIOUS: 0
JOINT SWELLING: 1
WEAKNESS: 0
COUGH: 0
EYE PAIN: 0

## 2022-08-30 ENCOUNTER — OFFICE VISIT (OUTPATIENT)
Dept: FAMILY MEDICINE | Facility: CLINIC | Age: 62
End: 2022-08-30
Payer: COMMERCIAL

## 2022-08-30 VITALS
OXYGEN SATURATION: 98 % | HEART RATE: 70 BPM | SYSTOLIC BLOOD PRESSURE: 120 MMHG | DIASTOLIC BLOOD PRESSURE: 80 MMHG | BODY MASS INDEX: 32.42 KG/M2 | WEIGHT: 194.6 LBS | HEIGHT: 65 IN | TEMPERATURE: 98.2 F

## 2022-08-30 DIAGNOSIS — E78.5 DYSLIPIDEMIA: ICD-10-CM

## 2022-08-30 DIAGNOSIS — Z12.31 VISIT FOR SCREENING MAMMOGRAM: ICD-10-CM

## 2022-08-30 DIAGNOSIS — G25.81 RESTLESS LEG SYNDROME: ICD-10-CM

## 2022-08-30 DIAGNOSIS — Z00.00 ROUTINE GENERAL MEDICAL EXAMINATION AT A HEALTH CARE FACILITY: Primary | ICD-10-CM

## 2022-08-30 DIAGNOSIS — R94.6 ABNORMAL FINDING ON THYROID FUNCTION TEST: ICD-10-CM

## 2022-08-30 DIAGNOSIS — L65.9 HAIR LOSS: ICD-10-CM

## 2022-08-30 DIAGNOSIS — B00.1 HERPES SIMPLEX LABIALIS: ICD-10-CM

## 2022-08-30 PROCEDURE — 90750 HZV VACC RECOMBINANT IM: CPT | Performed by: INTERNAL MEDICINE

## 2022-08-30 PROCEDURE — 90471 IMMUNIZATION ADMIN: CPT | Performed by: INTERNAL MEDICINE

## 2022-08-30 PROCEDURE — 99396 PREV VISIT EST AGE 40-64: CPT | Mod: 25 | Performed by: INTERNAL MEDICINE

## 2022-08-30 PROCEDURE — 99214 OFFICE O/P EST MOD 30 MIN: CPT | Mod: 25 | Performed by: INTERNAL MEDICINE

## 2022-08-30 RX ORDER — GABAPENTIN 100 MG/1
100 CAPSULE ORAL
Qty: 90 CAPSULE | Refills: 0 | Status: SHIPPED | OUTPATIENT
Start: 2022-08-30

## 2022-08-30 RX ORDER — VALACYCLOVIR HYDROCHLORIDE 1 G/1
1000 TABLET, FILM COATED ORAL 2 TIMES DAILY PRN
Qty: 30 TABLET | Refills: 0 | Status: SHIPPED | OUTPATIENT
Start: 2022-08-30 | End: 2022-09-16

## 2022-08-30 ASSESSMENT — ENCOUNTER SYMPTOMS
DIZZINESS: 0
NAUSEA: 0
ABDOMINAL PAIN: 0
HEARTBURN: 0
FEVER: 0
ARTHRALGIAS: 0
PARESTHESIAS: 0
MYALGIAS: 0
COUGH: 0
SORE THROAT: 0
JOINT SWELLING: 1
BREAST MASS: 0
WEAKNESS: 0
DYSURIA: 0
CHILLS: 0
CONSTIPATION: 0
SHORTNESS OF BREATH: 0
HEMATOCHEZIA: 0
NERVOUS/ANXIOUS: 0
PALPITATIONS: 0
EYE PAIN: 0
FREQUENCY: 0
HEMATURIA: 0
HEADACHES: 0
DIARRHEA: 0

## 2022-08-30 ASSESSMENT — PAIN SCALES - GENERAL: PAINLEVEL: NO PAIN (0)

## 2022-08-30 NOTE — PROGRESS NOTES
SUBJECTIVE:   CC: Sonja Morfin is an 62 year old woman who presents for preventive health visit.     Patient has been advised of split billing requirements and indicates understanding: Yes  Healthy Habits:     Getting at least 3 servings of Calcium per day:  Yes    Bi-annual eye exam:  Yes    Dental care twice a year:  Yes    Sleep apnea or symptoms of sleep apnea:  None    Diet:  Regular (no restrictions)    Frequency of exercise:  4-5 days/week    Duration of exercise:  30-45 minutes    Taking medications regularly:  Yes    PHQ-2 Total Score: 0    Additional concerns today:  Yes    Today's PHQ-2 Score:   PHQ-2 ( 1999 [employer]) 8/26/2022   Q1: Little interest or pleasure in doing things 0   Q2: Feeling down, depressed or hopeless 0   PHQ-2 Score 0   PHQ-2 Total Score (12-17 Years)- Positive if 3 or more points; Administer PHQ-A if positive -   Q1: Little interest or pleasure in doing things Not at all   Q2: Feeling down, depressed or hopeless Not at all   PHQ-2 Score 0       Abuse: Current or Past (Physical, Sexual or Emotional) - No  Do you feel safe in your environment? Yes    Have you ever done Advance Care Planning? (For example, a Health Directive, POLST, or a discussion with a medical provider or your loved ones about your wishes): Yes, patient states has an Advance Care Planning document and will bring a copy to the clinic.    Social History     Tobacco Use     Smoking status: Never Smoker     Smokeless tobacco: Never Used   Substance Use Topics     Alcohol use: Yes     Alcohol/week: 2.0 standard drinks     Types: 2 Glasses of wine per week     Comment: 2x/week     If you drink alcohol do you typically have >3 drinks per day or >7 drinks per week? Yes    Alcohol Use 8/26/2022   Prescreen: >3 drinks/day or >7 drinks/week? No   No flowsheet data found.    Reviewed orders with patient.  Reviewed health maintenance and updated orders accordingly - Yes  Lab work is in process  Labs reviewed in EPIC    Breast  Cancer Screening:    Breast CA Risk Assessment (FHS-7) 8/26/2022   Do you have a family history of breast, colon, or ovarian cancer? No / Unknown       click delete button to remove this line now  Mammogram Screening: Recommended mammography every 1-2 years with patient discussion and risk factor consideration  Pertinent mammograms are reviewed under the imaging tab.    History of abnormal Pap smear: NO - age 30- 65 PAP every 3 years recommended     Reviewed and updated as needed this visit by clinical staff   Tobacco  Allergies  Meds  Problems  Med Hx  Surg Hx  Fam Hx            Reviewed and updated as needed this visit by Provider   Tobacco  Allergies  Meds  Problems  Med Hx  Surg Hx  Fam Hx           Past Medical History:   Diagnosis Date     Arthritis ?    is this what is going on w my right pinky?     NO ACTIVE PROBLEMS       Past Surgical History:   Procedure Laterality Date     COLONOSCOPY  ?     COLPORRHAPHY ANTERIOR, POSTERIOR, COMBINED N/A 10/20/2021    Procedure: ANTERIOR AND POSTERIOR REPAIR,;  Surgeon: Shakir Hassan MD;  Location:  OR     CYSTOSCOPY, SLING TRANSVAGINAL N/A 10/20/2021    Procedure: ADVANTAGE FIT BLADDER SLING, CYSTOSCOPY;  Surgeon: Shakir Hassan MD;  Location:  OR     DAVINCI HYSTERECTOMY SUPRACERVICAL, SACROCOLPOPEXY, COMBINED N/A 10/20/2021    Procedure: ROBOTIC ASSISTED SUPRACERVICAL HYSTERECTOMY, SACROCOLPOPEXY;  Surgeon: Shakir Hassan MD;  Location:  OR     DAVINCI SALPINGECTOMY N/A 10/20/2021    Procedure: BILATERAL SALPINGECTOMY;  Surgeon: Shakir Hassan MD;  Location:  OR     ORTHOPEDIC SURGERY  2/21     TONSILLECTOMY       OB History   No obstetric history on file.       Review of Systems   Constitutional: Negative for chills and fever.   HENT: Negative for congestion, ear pain, hearing loss and sore throat.    Eyes: Negative for pain and visual disturbance.   Respiratory: Negative for cough and shortness of breath.    Cardiovascular:  "Negative for chest pain, palpitations and peripheral edema.   Gastrointestinal: Negative for abdominal pain, constipation, diarrhea, heartburn, hematochezia and nausea.   Breasts:  Negative for tenderness, breast mass and discharge.   Genitourinary: Negative for dysuria, frequency, genital sores, hematuria, pelvic pain, urgency, vaginal bleeding and vaginal discharge.   Musculoskeletal: Positive for joint swelling. Negative for arthralgias and myalgias.   Skin: Negative for rash.   Neurological: Negative for dizziness, weakness, headaches and paresthesias.   Psychiatric/Behavioral: Negative for mood changes. The patient is not nervous/anxious.      CONSTITUTIONAL: NEGATIVE for fever, chills, change in weight  INTEGUMENTARY/SKIN: NEGATIVE for worrisome rashes, moles or lesions  EYES: NEGATIVE for vision changes or irritation  ENT: NEGATIVE for ear, mouth and throat problems  RESP: NEGATIVE for significant cough or SOB  BREAST: NEGATIVE for masses, tenderness or discharge  CV: NEGATIVE for chest pain, palpitations or peripheral edema  GI: NEGATIVE for nausea, abdominal pain, heartburn, or change in bowel habits  : NEGATIVE for unusual urinary or vaginal symptoms. No vaginal bleeding.  MUSCULOSKELETAL: NEGATIVE for significant arthralgias or myalgia  NEURO: NEGATIVE for weakness, dizziness or paresthesias  PSYCHIATRIC: NEGATIVE for changes in mood or affect      OBJECTIVE:   /80   Pulse 70   Temp 98.2  F (36.8  C) (Temporal)   Ht 1.651 m (5' 5\")   Wt 88.3 kg (194 lb 9.6 oz)   SpO2 98%   BMI 32.38 kg/m    Physical Exam  GENERAL APPEARANCE: healthy, alert and no distress  EYES: Eyes grossly normal to inspection, PERRL and conjunctivae and sclerae normal  HENT: ear canals and TM's normal, nose and mouth without ulcers or lesions, oropharynx clear and oral mucous membranes moist  NECK: no adenopathy, no asymmetry, masses, or scars and thyroid normal to palpation  RESP: lungs clear to auscultation - no rales, " rhonchi or wheezes  BREAST: normal without masses, tenderness or nipple discharge and no palpable axillary masses or adenopathy  CV: regular rate and rhythm, normal S1 S2, no S3 or S4, no murmur, click or rub, no peripheral edema and peripheral pulses strong  ABDOMEN: soft, nontender, no hepatosplenomegaly, no masses and bowel sounds normal  MS: no musculoskeletal defects are noted and gait is age appropriate without ataxia. Possible osteoarthritis changes on the phalanges with Heberdens nodes and explained on home physical therapy.   SKIN: no suspicious lesions or rashes  NEURO: Normal strength and tone, sensory exam grossly normal, mentation intact and speech normal  PSYCH: mentation appears normal and affect normal/bright    Diagnostic Test Results:  Labs reviewed in Epic    ASSESSMENT/PLAN:     Assessment and Plan  1. Routine general medical examination at a health care facility  Last seen pt in 10/2021 for preop clearance of hysterectomy at that time. Pt is here for annual physical. Pt does have last labs in 6/2022 with low T4 , T3 and testosterone which were all done as part of her hair loss by dermatology. . Normal CBC. Last Colonoscopy in 2021 >> 7 yrs recheck   - MA SCREENING DIGITAL BILAT - Future  (s+30); Future  - ZOSTER VACCINE RECOMBINANT ADJUVANTED (SHINGRIX)  - TSH with free T4 reflex; Future  - T3, Free; Future  - T4, free; Future  - Lipid panel reflex to direct LDL Fasting; Future    2. Visit for screening mammogram  - MA SCREENING DIGITAL BILAT - Future  (s+30); Future    3. Dyslipidemia  - Lipid panel reflex to direct LDL Fasting; Future    4. Abnormal finding on thyroid function test  Pt requesting for recheck of these hormones as she was taking Biotin at the time of the lab work and apparantly it does have possible effect on abnormal thyroid function.Currently she is Off Biotin for more than a month.   - TSH with free T4 reflex; Future  - T3, Free; Future  - T4, free; Future    5. Hair  loss  Ongoing problem, Uncontrolled. Recently seen dermatology and had few labs which were abnormal. Will recheck and do further recommendations.   - TSH with free T4 reflex; Future  - T3, Free; Future  - T4, free; Future    6. Restless leg syndrome  Chronic problem, with side effects of too much dose of gabapentin. Will adjust the dose to 100 mg daily.   - gabapentin (NEURONTIN) 100 MG capsule; Take 1 capsule (100 mg) by mouth nightly as needed for other (RLS)  Dispense: 90 capsule; Refill: 0    7. Herpes simplex labialis  Chronic problem, requiring as needed Valacyclovir. Shared decision for possible need of maintenance therapy if patient has too many recurrences which consumes all the tablets given to her today.   - valACYclovir (VALTREX) 1000 mg tablet; Take 1 tablet (1,000 mg) by mouth 2 times daily as needed (COLD SORES)  Dispense: 30 tablet; Refill: 0       Patient Instructions   As discussed , will do the pertinent labs in your physical today.   Please make a fasting LAB only appointment.     ===========================      Preventive Health Recommendations  Female Ages 50 - 64    Yearly exam: See your health care provider every year in order to  o Review health changes.   o Discuss preventive care.    o Review your medicines if your doctor has prescribed any.      Get a Pap test every three years (unless you have an abnormal result and your provider advises testing more often).    If you get Pap tests with HPV test, you only need to test every 5 years, unless you have an abnormal result.     You do not need a Pap test if your uterus was removed (hysterectomy) and you have not had cancer.    You should be tested each year for STDs (sexually transmitted diseases) if you're at risk.     Have a mammogram every 1 to 2 years.    Have a colonoscopy at age 50, or have a yearly FIT test (stool test). These exams screen for colon cancer.      Have a cholesterol test every 5 years, or more often if advised.    Have a  "diabetes test (fasting glucose) every three years. If you are at risk for diabetes, you should have this test more often.     If you are at risk for osteoporosis (brittle bone disease), think about having a bone density scan (DEXA).    Shots: Get a flu shot each year. Get a tetanus shot every 10 years.    Nutrition:     Eat at least 5 servings of fruits and vegetables each day.    Eat whole-grain bread, whole-wheat pasta and brown rice instead of white grains and rice.    Get adequate Calcium and Vitamin D.     Lifestyle    Exercise at least 150 minutes a week (30 minutes a day, 5 days a week). This will help you control your weight and prevent disease.    Limit alcohol to one drink per day.    No smoking.     Wear sunscreen to prevent skin cancer.     See your dentist every six months for an exam and cleaning.    See your eye doctor every 1 to 2 years.      Return in about 6 months (around 2/28/2023), or if symptoms worsen or fail to improve, for If symptoms persist, Follow up of last visit.    Luisana Nj MD  [facility]      Patient has been advised of split billing requirements and indicates understanding: Yes    COUNSELING:  Reviewed preventive health counseling, as reflected in patient instructions  Special attention given to:        Regular exercise       Healthy diet/nutrition       Vision screening       Hearing screening       Osteoporosis prevention/bone health       Colorectal Cancer Screening    Estimated body mass index is 32.38 kg/m  as calculated from the following:    Height as of this encounter: 1.651 m (5' 5\").    Weight as of this encounter: 88.3 kg (194 lb 9.6 oz).    Weight management plan: Discussed healthy diet and exercise guidelines    She reports that she has never smoked. She has never used smokeless tobacco.      Counseling Resources:  ATP IV Guidelines  Pooled Cohorts Equation Calculator  Breast Cancer Risk Calculator  BRCA-Related Cancer Risk Assessment: " FHS-7 Tool  FRAX Risk Assessment  ICSI Preventive Guidelines  Dietary Guidelines for Americans, 2010  USDA's MyPlate  ASA Prophylaxis  Lung CA Screening    Luisana Nj MD  [facility] [place]

## 2022-08-30 NOTE — PATIENT INSTRUCTIONS
As discussed , will do the pertinent labs in your physical today.   Please make a fasting LAB only appointment.     ===========================      Preventive Health Recommendations  Female Ages 50 - 64    Yearly exam: See your health care provider every year in order to  Review health changes.   Discuss preventive care.    Review your medicines if your doctor has prescribed any.    Get a Pap test every three years (unless you have an abnormal result and your provider advises testing more often).  If you get Pap tests with HPV test, you only need to test every 5 years, unless you have an abnormal result.   You do not need a Pap test if your uterus was removed (hysterectomy) and you have not had cancer.  You should be tested each year for STDs (sexually transmitted diseases) if you're at risk.   Have a mammogram every 1 to 2 years.  Have a colonoscopy at age 50, or have a yearly FIT test (stool test). These exams screen for colon cancer.    Have a cholesterol test every 5 years, or more often if advised.  Have a diabetes test (fasting glucose) every three years. If you are at risk for diabetes, you should have this test more often.   If you are at risk for osteoporosis (brittle bone disease), think about having a bone density scan (DEXA).    Shots: Get a flu shot each year. Get a tetanus shot every 10 years.    Nutrition:   Eat at least 5 servings of fruits and vegetables each day.  Eat whole-grain bread, whole-wheat pasta and brown rice instead of white grains and rice.  Get adequate Calcium and Vitamin D.     Lifestyle  Exercise at least 150 minutes a week (30 minutes a day, 5 days a week). This will help you control your weight and prevent disease.  Limit alcohol to one drink per day.  No smoking.   Wear sunscreen to prevent skin cancer.   See your dentist every six months for an exam and cleaning.  See your eye doctor every 1 to 2 years.

## 2022-09-08 DIAGNOSIS — B00.1 HERPES SIMPLEX LABIALIS: ICD-10-CM

## 2022-09-13 ENCOUNTER — LAB (OUTPATIENT)
Dept: LAB | Facility: CLINIC | Age: 62
End: 2022-09-13
Payer: COMMERCIAL

## 2022-09-13 DIAGNOSIS — Z00.00 ROUTINE GENERAL MEDICAL EXAMINATION AT A HEALTH CARE FACILITY: ICD-10-CM

## 2022-09-13 DIAGNOSIS — R94.6 ABNORMAL FINDING ON THYROID FUNCTION TEST: ICD-10-CM

## 2022-09-13 DIAGNOSIS — L65.9 HAIR LOSS: ICD-10-CM

## 2022-09-13 DIAGNOSIS — E78.5 DYSLIPIDEMIA: ICD-10-CM

## 2022-09-13 LAB
CHOLEST SERPL-MCNC: 193 MG/DL
FASTING STATUS PATIENT QL REPORTED: YES
HDLC SERPL-MCNC: 60 MG/DL
LDLC SERPL CALC-MCNC: 118 MG/DL
NONHDLC SERPL-MCNC: 133 MG/DL
T3FREE SERPL-MCNC: 2.4 PG/ML (ref 2–4.4)
T4 FREE SERPL-MCNC: 0.75 NG/DL (ref 0.76–1.46)
TRIGL SERPL-MCNC: 76 MG/DL
TSH SERPL DL<=0.005 MIU/L-ACNC: 2.1 MU/L (ref 0.4–4)

## 2022-09-13 PROCEDURE — 84443 ASSAY THYROID STIM HORMONE: CPT

## 2022-09-13 PROCEDURE — 84481 FREE ASSAY (FT-3): CPT

## 2022-09-13 PROCEDURE — 84439 ASSAY OF FREE THYROXINE: CPT

## 2022-09-13 PROCEDURE — 80061 LIPID PANEL: CPT

## 2022-09-13 PROCEDURE — 36415 COLL VENOUS BLD VENIPUNCTURE: CPT

## 2022-09-16 ENCOUNTER — TELEPHONE (OUTPATIENT)
Dept: FAMILY MEDICINE | Facility: CLINIC | Age: 62
End: 2022-09-16

## 2022-09-16 RX ORDER — VALACYCLOVIR HYDROCHLORIDE 1 G/1
1000 TABLET, FILM COATED ORAL 2 TIMES DAILY PRN
Qty: 180 TABLET | Refills: 0 | Status: SHIPPED | OUTPATIENT
Start: 2022-09-16 | End: 2023-01-19

## 2022-09-16 NOTE — TELEPHONE ENCOUNTER
Patient Contact     Attempt # 1     Was call answered?    No  Left non-detailed message to call the clinic back at [phone].      On call back:      -Relay message from Dr. Nj, see below.     Dolly RUFF RN  [facility]

## 2022-09-16 NOTE — TELEPHONE ENCOUNTER
Please see request for 90 day supply. Recent rx from provider was for short one month supply. Is provider OK with higher dispense volume? Pended if needed.     Dolly RUFF RN  [facility]

## 2022-09-16 NOTE — TELEPHONE ENCOUNTER
----- Message from Luisana Nj MD sent at 9/13/2022 10:37 PM CDT -----  Good news! Your TSH and free T3 levels are normalized compared to the past. Free T4 levels are mildly low which may spontaneously resolve as a pat of subclinical hypothyroidism.     Your Bad cholesterol levels LDL are borderline normal. Recommend diet modifications of avoiding red meat and fatty foods for improvement.     Please let me know if you have any questions.   Luisana Nj MD on 9/13/2022

## 2022-09-20 NOTE — TELEPHONE ENCOUNTER
Patient Contact    Attempt # 2    Was Call answered? No    Non-detailed voicemail left to call clinic at: [phone].     On Call Back:    Please relay provider's result note.    Soheila Newby RN  [facility] Triage Team

## 2022-09-23 NOTE — TELEPHONE ENCOUNTER
Patient reviewed results and message from provider via [employer].     Written by Luisana Nj MD on 9/13/2022 10:37 PM CDT View Full Comments  Seen by patient Jessie Morfin on 9/23/2022  8:28 AM    Dolly RUFF RN  [facility]

## 2022-12-05 ENCOUNTER — ALLIED HEALTH/NURSE VISIT (OUTPATIENT)
Dept: FAMILY MEDICINE | Facility: CLINIC | Age: 62
End: 2022-12-05
Payer: COMMERCIAL

## 2022-12-05 DIAGNOSIS — Z23 NEED FOR VACCINATION: Primary | ICD-10-CM

## 2022-12-05 PROCEDURE — 90750 HZV VACC RECOMBINANT IM: CPT

## 2022-12-05 PROCEDURE — 90471 IMMUNIZATION ADMIN: CPT

## 2023-01-18 DIAGNOSIS — B00.1 HERPES SIMPLEX LABIALIS: ICD-10-CM

## 2023-01-19 RX ORDER — VALACYCLOVIR HYDROCHLORIDE 1 G/1
1000 TABLET, FILM COATED ORAL 2 TIMES DAILY PRN
Qty: 180 TABLET | Refills: 0 | Status: SHIPPED | OUTPATIENT
Start: 2023-01-19

## 2023-02-10 ENCOUNTER — TELEPHONE (OUTPATIENT)
Dept: INTERNAL MEDICINE | Facility: CLINIC | Age: 63
End: 2023-02-10
Payer: COMMERCIAL

## 2023-02-10 NOTE — TELEPHONE ENCOUNTER
Patient Quality Outreach    Patient is due for the following:   Cervical Cancer Screening - PAP Needed    Next Steps:   Schedule appointment for PAP   PAP is done in 2021 (next due 09/2024)    Type of outreach:    Sent [employer] message.   No further messages.      Questions for provider review:    None     Zeinab De Santiago MA

## 2023-03-14 ENCOUNTER — TELEPHONE (OUTPATIENT)
Dept: FAMILY MEDICINE | Facility: CLINIC | Age: 63
End: 2023-03-14

## 2023-03-14 NOTE — TELEPHONE ENCOUNTER
Patient Quality Outreach    Patient is due for the following:   Breast Cancer Screening - Mammogram  Physical Preventive Adult Physical    Next Steps:   Schedule mammogram, Annual Physical    Type of outreach:    Sent letter.   Sent [employer] message  Sent letter 06/16/2023      Questions for provider review:    None     Zeinab De Santiago MA

## 2023-03-14 NOTE — LETTER
June 16, 2023      Sonja Morfin  [address] [place] [address]        Dear Sonja,    I care about your health and have reviewed your health plan. I have reviewed your medical conditions, medication list, and lab results and am making recommendations based on this review, to better manage your health.    You are in particular need of attention regarding:  -Breast Cancer Screening    I am recommending that you:  -schedule a MAMMOGRAM which is due. We have a mobile mammogram unit that comes to [facility].To schedule please call the clinic at [phone]. Or, you can call [facility] at [phone] to schedule this.  Please disregard this reminder if you have had this exam elsewhere within the last year.  It would be helpful for us to have a copy of your mammogram report in our file so that we can best coordinate your care.    Here is a list of Health Maintenance topics that are due now or due soon:  Health Maintenance Due   Topic Date Due    Mammogram  Never done    ANNUAL REVIEW OF HM ORDERS  10/07/2022    PHQ-2 (once per calendar year)  01/01/2023       Please call us at [phone] (or use [employer]) to address the above recommendations.     Thank you for trusting [facility] and we appreciate the opportunity to serve you.  We look forward to supporting your healthcare needs in the future.    Healthy Regards,    Luisana Nj MD

## 2023-10-12 ENCOUNTER — OFFICE VISIT (OUTPATIENT)
Dept: PLASTIC SURGERY | Facility: CLINIC | Age: 63
End: 2023-10-12

## 2023-10-12 DIAGNOSIS — Z41.1 ENCOUNTER FOR COSMETIC PROCEDURE: Primary | ICD-10-CM

## 2023-10-12 NOTE — LETTER
10/12/2023       RE: Sonja Morfin  [address] [place] [address]     Dear Colleague,    Thank you for referring your patient, Sojna Morfin, to the [facility] at [facility]. Please see a copy of my visit note below.    Jessie is in to see us today for evaluation of facial aging she has been referred by Dr. Margie Khoury.  She is troubled by all aspects of facial aging but in particular laxity and fine  wrinkling.  She also notes midface descent volume loss in the middle third of the face and shehas considered Botox and fillers.  She has had 1 halo treatment but was unhappy with the outcome and found that it was a very painful experience.  She has some concern about surgical incisions and what scarring would be like.    She lost her  several years ago and then the more recent years she is focusing on getting herself healthy and with that she is lost a fair amount of weight which has made her more aware of the soft tissue aging effects particularly as it relates to volume depletion and superficial wrinkling.  She had knee surgery in February 21 and gynecologic surgery in October 2021 she is also concerned with the periorbital area.    Exam: She appears to be a fit pleasant middle-age woman with Holley 2 skin.  She has significant sun damage diffusely .  She has hyperdynamic forehead wrinkles with elevation of the brow.  She has a deep upper eyelid sulcus, and ptosis.  She has midface volume descent and jowl formation there is a limited amount of neck laxity.  She has some pseudo fat herniation in the lower lids but that contour changes are accentuated by the descent of the midface soft tissues.  She has numerous fine rhytids throughout her face with accentuation in the perioral region.  She has volume loss in the lip with decreased vermilion show and loss of the vermilion border.  The upper lip is slightly long.  She finds that her nasal  base is somewhat wide and does not want this accentuated with surgery.  I told her that that is often a transient thing after her facelift surgery but rarely is it a persisting issue.  She has some buccal fat excess anteriorly as well.  She could consider having a facelift, buccal fat removal, subnasal lip lift, and fullface resurfacing.  She has significant bilateral ptosis.  I explained to her that hyperdynamic forward wrinkles are often seen with ptosis and that correction of the ptosis should be an important part of her overall aesthetic gain.  I would like for her to reflect on our conversation today and get together again.    Assessment diffuse facial aging with soft tissue laxity, volume distribution issues, fine wrinkling, and pigmentary irregularities.  She also has ptosis.  Recommendation: I would ask that she think about our conversation today and get together with us again.  At that time I would like her to see Dr. Ivey and me because I think because I think she needs buccal fat removal and resurfacing along with a facelift.  I would have her ptosis surgery addressed first as that may decrease some of her forehead rhytids.  We have given her Dr. Thomas's contact information.    Were very flattered to Dr. Betancourt suggested that she visit with us.          Again, thank you for allowing me to participate in the care of your patient.      Sincerely,    MARYELLEN JIMENEZ MD

## 2023-10-12 NOTE — PROGRESS NOTES
Jessie is in to see us today for evaluation of facial aging she has been referred by Dr. Margie Khoury.  She is troubled by all aspects of facial aging but in particular laxity and fine  wrinkling.  She also notes midface descent volume loss in the middle third of the face and shehas considered Botox and fillers.  She has had 1 halo treatment but was unhappy with the outcome and found that it was a very painful experience.  She has some concern about surgical incisions and what scarring would be like.    She lost her  several years ago and then the more recent years she is focusing on getting herself healthy and with that she is lost a fair amount of weight which has made her more aware of the soft tissue aging effects particularly as it relates to volume depletion and superficial wrinkling.  She had knee surgery in February 21 and gynecologic surgery in October 2021 she is also concerned with the periorbital area.    Exam: She appears to be a fit pleasant middle-age woman with Holley 2 skin.  She has significant sun damage diffusely .  She has hyperdynamic forehead wrinkles with elevation of the brow.  She has a deep upper eyelid sulcus, and ptosis.  She has midface volume descent and jowl formation there is a limited amount of neck laxity.  She has some pseudo fat herniation in the lower lids but that contour changes are accentuated by the descent of the midface soft tissues.  She has numerous fine rhytids throughout her face with accentuation in the perioral region.  She has volume loss in the lip with decreased vermilion show and loss of the vermilion border.  The upper lip is slightly long.  She finds that her nasal base is somewhat wide and does not want this accentuated with surgery.  I told her that that is often a transient thing after her facelift surgery but rarely is it a persisting issue.  She has some buccal fat excess anteriorly as well.  She could consider having a facelift, buccal fat removal,  subnasal lip lift, and fullface resurfacing.  She has significant bilateral ptosis.  I explained to her that hyperdynamic forward wrinkles are often seen with ptosis and that correction of the ptosis should be an important part of her overall aesthetic gain.  I would like for her to reflect on our conversation today and get together again.    Assessment diffuse facial aging with soft tissue laxity, volume distribution issues, fine wrinkling, and pigmentary irregularities.  She also has ptosis.  Recommendation: I would ask that she think about our conversation today and get together with us again.  At that time I would like her to see Dr. Ivey and me because I think because I think she needs buccal fat removal and resurfacing along with a facelift.  I would have her ptosis surgery addressed first as that may decrease some of her forehead rhytids.  We have given her Dr. Thomas's contact information.    Were very flattered to Dr. Betancourt suggested that she visit with us.  MARYELLEN JIMENEZ MD

## 2023-10-12 NOTE — LETTER
October 12, 2023  Re: Sonja Morfin  1960    Dear Dr. Khoury,    Thank you so much for referring Sonja Morfin to the [facility]. I had the pleasure of visiting with Sonja today.     Attached you will find a copy of my note. Please feel free to reach out to me with any questions, [phone].     Jessie is in to see us today for evaluation of facial aging she has been referred by Dr. Margie Khoury.  She is troubled by all aspects of facial aging but in particular laxity and fine  wrinkling.  She also notes midface descent volume loss in the middle third of the face and shehas considered Botox and fillers.  She has had 1 halo treatment but was unhappy with the outcome and found that it was a very painful experience.  She has some concern about surgical incisions and what scarring would be like.    She lost her  several years ago and then the more recent years she is focusing on getting herself healthy and with that she is lost a fair amount of weight which has made her more aware of the soft tissue aging effects particularly as it relates to volume depletion and superficial wrinkling.  She had knee surgery in February 21 and gynecologic surgery in October 2021 she is also concerned with the periorbital area.    Exam: She appears to be a fit pleasant middle-age woman with Holley 2 skin.  She has significant sun damage diffusely .  She has hyperdynamic forehead wrinkles with elevation of the brow.  She has a deep upper eyelid sulcus, and ptosis.  She has midface volume descent and jowl formation there is a limited amount of neck laxity.  She has some pseudo fat herniation in the lower lids but that contour changes are accentuated by the descent of the midface soft tissues.  She has numerous fine rhytids throughout her face with accentuation in the perioral region.  She has volume loss in the lip with decreased vermilion show and loss of the vermilion border.  The upper lip is slightly long.  She finds that  her nasal base is somewhat wide and does not want this accentuated with surgery.  I told her that that is often a transient thing after her facelift surgery but rarely is it a persisting issue.  She has some buccal fat excess anteriorly as well.  She could consider having a facelift, buccal fat removal, subnasal lip lift, and fullface resurfacing.  She has significant bilateral ptosis.  I explained to her that hyperdynamic forward wrinkles are often seen with ptosis and that correction of the ptosis should be an important part of her overall aesthetic gain.  I would like for her to reflect on our conversation today and get together again.    Assessment diffuse facial aging with soft tissue laxity, volume distribution issues, fine wrinkling, and pigmentary irregularities.  She also has ptosis.  Recommendation: I would ask that she think about our conversation today and get together with us again.  At that time I would like her to see Dr. Ivey and me because I think because I think she needs buccal fat removal and resurfacing along with a facelift.  I would have her ptosis surgery addressed first as that may decrease some of her forehead rhytids.  We have given her Dr. Thomas's contact information.    Were very flattered to Dr. Betancourt suggested that she visit with us.      Your trust in our practice and care is much appreciated.    Sincerely,    MARYELLEN JIMENEZ MD

## 2023-10-12 NOTE — LETTER
10/12/2023       RE: Sonja Morfin  [address] [place] [address]     Dear Colleague,    Thank you for referring your patient, Sonja Morfin, to the [facility] at [facility]. Please see a copy of my visit note below.    Jessie is in to see us today for evaluation of facial aging she has been referred by Dr. Margie Khoury.  She is troubled by all aspects of facial aging but in particular laxity and fine  wrinkling.  She also notes midface descent volume loss in the middle third of the face and shehas considered Botox and fillers.  She has had 1 halo treatment but was unhappy with the outcome and found that it was a very painful experience.  She has some concern about surgical incisions and what scarring would be like.    She lost her  several years ago and then the more recent years she is focusing on getting herself healthy and with that she is lost a fair amount of weight which has made her more aware of the soft tissue aging effects particularly as it relates to volume depletion and superficial wrinkling.  She had knee surgery in February 21 and gynecologic surgery in October 2021 she is also concerned with the periorbital area.    Exam: She appears to be a fit pleasant middle-age woman with Holley 2 skin.  She has significant sun damage diffusely .  She has hyperdynamic forehead wrinkles with elevation of the brow.  She has a deep upper eyelid sulcus, and ptosis.  She has midface volume descent and jowl formation there is a limited amount of neck laxity.  She has some pseudo fat herniation in the lower lids but that contour changes are accentuated by the descent of the midface soft tissues.  She has numerous fine rhytids throughout her face with accentuation in the perioral region.  She has volume loss in the lip with decreased vermilion show and loss of the vermilion border.  The upper lip is slightly long.  She finds that her nasal  base is somewhat wide and does not want this accentuated with surgery.  I told her that that is often a transient thing after her facelift surgery but rarely is it a persisting issue.  She has some buccal fat excess anteriorly as well.  She could consider having a facelift, buccal fat removal, subnasal lip lift, and fullface resurfacing.  She has significant bilateral ptosis.  I explained to her that hyperdynamic forward wrinkles are often seen with ptosis and that correction of the ptosis should be an important part of her overall aesthetic gain.  I would like for her to reflect on our conversation today and get together again.    Assessment diffuse facial aging with soft tissue laxity, volume distribution issues, fine wrinkling, and pigmentary irregularities.  She also has ptosis.  Recommendation: I would ask that she think about our conversation today and get together with us again.  At that time I would like her to see Dr. Ivey and me because I think because I think she needs buccal fat removal and resurfacing along with a facelift.  I would have her ptosis surgery addressed first as that may decrease some of her forehead rhytids.  We have given her Dr. Thomas's contact information.    Were very flattered to Dr. Betancourt suggested that she visit with us.  MARYELLEN JIMENEZ MD     Again, thank you for allowing me to participate in the care of your patient.      Sincerely,    MARYELLEN JIMENEZ MD

## 2023-10-19 ENCOUNTER — OFFICE VISIT (OUTPATIENT)
Dept: PLASTIC SURGERY | Facility: CLINIC | Age: 63
End: 2023-10-19

## 2023-10-19 DIAGNOSIS — Z41.1 ENCOUNTER FOR COSMETIC PROCEDURE: Primary | ICD-10-CM

## 2023-10-19 NOTE — PROGRESS NOTES
Patient given updated quote for cosmetic procedures at appointment today. Quote was explained in detail, including but not limited to the $500.00 non-refundable deposit due at time of scheduling, when/where payment is due, facility fees being subject to change, and six weeks minimum cancellation notice. Patient verbalized understanding of quote. Signed quote was obtained (see media tab), a copy sent home with pt. Education for quoted procedures was provided both verbally and in educational take home folder including pre & post op care, medications to avoid before and after surgery, and more. All questions answered at this time. Pt will reach out if questions arise.    Marium Walsh RN  10/19/2023 12:55 PM

## 2023-10-19 NOTE — PROGRESS NOTES
Facial Plastic and Reconstructive Surgery Cosmetic Consultation    Referring Provider:   Jarod Rose MD  [address] [place] [address]    HPI:   I had the pleasure of seeing Sonja Morfin today in clinic for consultation for surgical facial rejuvenation.    Sonja Morfin is a 63 year old female.  She was seen by my partner Dr. Jarod Rose to discuss surgical facial rejuvenation including facelift, brow lift, and lip lift.  He also referred her to Dr. Bull Thomas for bilateral ptosis repair and recommended she do this before any other surgical procedures.  She was referred to me for discussion of resurfacing and buccal fat pad removal.      PE:  Alert and Oriented, Answering Questions Appropriately  Atraumatic, Normocephalic, Face Symmetric  Skin: Holley 2  Facial Nerve Intact and facial movement symmetric  She has aging changes to her skin including combination of deep and more superficial rhytids.  She has areas of hyperpigmentation throughout.  She has deep horizontal forehead rhytids.  She has blunting of her cervical mental angle as well as some submental fullness.  She does have some fullness in the area the buccal fat pads bilaterally along with midfacial descent.       IMPRESSION/PLAN: This is a very pleasant 63-year-old female presenting for evaluation of surgical facial rejuvenation.  She is planning undergo surgical procedures with my partner Dr. Rose including face and neck lift, lip lift, and possibly an endoscopic brow lift.  Prior to this procedure she is going to have ptosis surgery possibly with Dr. Thomas seeing him in January.  Today we discussed the addition of buccal fat pad removal along with resurfacing procedures.  She did have a HALO procedure in the past and found this to be very painful.  We discussed a 30% fullface TCA chemical peel.  I was candid that I would be careful over the areas that were dissected during the facelift surgery so that we do not compromise  the integrity of that skin, but I do think we can still get a nice peel at the same time as her other procedures.  In addition, I agree that she would benefit from buccal fat pad removal.  We discussed what this would entail and the risks and benefits associated.  She would like to think about it and will let us know if she decides.  She is going to come back to visit with Dr. Rose myself after her ptosis surgery with Dr. Thomas.      Photodocumentation was obtained.     Risks and benefits of the procedure were discussed, including but not limited to motor/sensory nerve damage (temporary and permanent), scarring, hematoma, irregularities of skin and underlying soft tissue, infection, asymmetry, and the need for additional procedures.

## 2023-10-29 ENCOUNTER — HEALTH MAINTENANCE LETTER (OUTPATIENT)
Age: 63
End: 2023-10-29

## 2024-01-18 ENCOUNTER — PATIENT OUTREACH (OUTPATIENT)
Dept: GASTROENTEROLOGY | Facility: CLINIC | Age: 64
End: 2024-01-18
Payer: COMMERCIAL

## 2024-05-23 ENCOUNTER — OFFICE VISIT (OUTPATIENT)
Dept: PLASTIC SURGERY | Facility: CLINIC | Age: 64
End: 2024-05-23

## 2024-05-23 DIAGNOSIS — Z41.1 ENCOUNTER FOR COSMETIC PROCEDURE: Primary | ICD-10-CM

## 2024-05-23 NOTE — LETTER
5/23/2024       RE: Sonja Morfin  [address] [place] [address]       Dear Colleague,    Thank you for referring your patient, Sonja Morfin, to the  [facility] at [facility]. Please see a copy of my visit note below.    Sonja is in to see us today to again discuss the recommended procedures.  We talked about the different types of correction that are achieved with the various procedures.  I would still recommend a facelift.  I think I would do a small amount of submental and jawline liposuction but not a full platysmaplasty.  I would do a deep plane facelift.  We would add a subnasal lip lift and buccal fat removal followed by a full face chemical peel.  She is seeing Dr. Thomas who discussed with her ptosis reconstruction and she thinks she would like to proceed with coordinating those to see if they can be done at the same time.  She does have very significant thinning hair and has some areas of ongoing hair loss and we talked with her about potential for surgery to make that worse.  Usually this is a transient issue.  We did talk about scar revision that is occasionally done for areas of hair loss.  The risks and benefits were discussed with her at length we showed her pictures of people who have had resurfacing with early and posttreatment results.  Risks and benefits and especially the limits of the surgeries were candidly discussed.  Dr. Roche would carry out the buccal fat removal and the chemical peel with us.  We spent an additional 30 minutes in consultation today and this included potential complications.      Again, thank you for allowing me to participate in the care of your patient.      Sincerely,    MARYELLEN JIMENEZ MD

## 2024-05-23 NOTE — LETTER
May 23, 2024  Re: Sonja Morfin  1960      Dear Dr. Hernandez,    Thank you so much for referring Sonja Morfin to the [facility]. I had the pleasure of visiting with Sonja today.     Attached you will find a copy of my note. Please feel free to reach out to me with any questions, [phone].     Sonja is in to see us today to again discuss the recommended procedures.  We talked about the different types of correction that are achieved with the various procedures.  I would still recommend a facelift.  I think I would do a small amount of submental and jawline liposuction but not a full platysmaplasty.  I would do a deep plane facelift.  We would add a subnasal lip lift and buccal fat removal followed by a full face chemical peel.  She is seeing Dr. Thomas who discussed with her ptosis reconstruction and she thinks she would like to proceed with coordinating those to see if they can be done at the same time.  She does have very significant thinning hair and has some areas of ongoing hair loss and we talked with her about potential for surgery to make that worse.  Usually this is a transient issue.  We did talk about scar revision that is occasionally done for areas of hair loss.  The risks and benefits were discussed with her at length we showed her pictures of people who have had resurfacing with early and posttreatment results.  Risks and benefits and especially the limits of the surgeries were candidly discussed.  Dr. Roche would carry out the buccal fat removal and the chemical peel with us.  We spent an additional 30 minutes in consultation today and this included potential complications.      Your trust in our practice and care is much appreciated.    Sincerely,    MARYELLEN JIMENEZ MD

## 2024-05-23 NOTE — PROGRESS NOTES
Sonja is in to see us today to again discuss the recommended procedures.  We talked about the different types of correction that are achieved with the various procedures.  I would still recommend a facelift.  I think I would do a small amount of submental and jawline liposuction but not a full platysmaplasty.  I would do a deep plane facelift.  We would add a subnasal lip lift and buccal fat removal followed by a full face chemical peel.  She is seeing Dr. Thomas who discussed with her ptosis reconstruction and she thinks she would like to proceed with coordinating those to see if they can be done at the same time.  She does have very significant thinning hair and has some areas of ongoing hair loss and we talked with her about potential for surgery to make that worse.  Usually this is a transient issue.  We did talk about scar revision that is occasionally done for areas of hair loss.  The risks and benefits were discussed with her at length we showed her pictures of people who have had resurfacing with early and posttreatment results.  Risks and benefits and especially the limits of the surgeries were candidly discussed.  Dr. Roche would carry out the buccal fat removal and the chemical peel with us.  We spent an additional 30 minutes in consultation today and this included potential complications.MARYELLEN JIMENEZ MD

## 2024-09-10 ENCOUNTER — HOSPITAL ENCOUNTER (OUTPATIENT)
Facility: CLINIC | Age: 64
End: 2024-09-10
Attending: OPHTHALMOLOGY | Admitting: OPHTHALMOLOGY
Payer: COMMERCIAL

## 2024-12-21 ENCOUNTER — HEALTH MAINTENANCE LETTER (OUTPATIENT)
Age: 64
End: 2024-12-21

## (undated) DEVICE — SU VICRYL 4-0 PS-2 18" UND J496H

## (undated) DEVICE — DAVINCI XI DRAPE COLUMN 470341

## (undated) DEVICE — ADH SKIN CLOSURE PREMIERPRO EXOFIN MICOR HV 0.5ML 3471

## (undated) DEVICE — SU STRATAFIX PDS PLUS 0 SPIRAL CT-1 15CM SXPP1B406

## (undated) DEVICE — BLADE KNIFE SURG 15 371115

## (undated) DEVICE — SU STRATAFIX PDS PLUS 2-0 SPIRAL CT-1 9" 22CM SXPP1B456

## (undated) DEVICE — GOWN IMPERVIOUS SPECIALTY XL/XLONG 39049

## (undated) DEVICE — SU VICRYL 3-0 SH 27" J316H

## (undated) DEVICE — LUBRICATING JELLY 10ML SYR STERILE

## (undated) DEVICE — DAVINCI XI NDL DRIVER MEGA SUTURE CUT 8MM 470309

## (undated) DEVICE — DAVINCI HOT SHEARS TIP COVER  400180

## (undated) DEVICE — NDL COUNTER 10CT

## (undated) DEVICE — EVAC SYSTEM CLEAR FLOW SC082500

## (undated) DEVICE — KIT PATIENT POSITIONING PIGAZZI LATEX FREE 40580

## (undated) DEVICE — SUCTION IRR STRYKERFLOW II W/TIP 250-070-520

## (undated) DEVICE — SYR 10ML LL W/O NDL 302995

## (undated) DEVICE — NDL 25GA 1.5" 305127

## (undated) DEVICE — TUBING IRRIG CYSTO/BLADDER SET 81" LF 2C4040

## (undated) DEVICE — GLOVE PROTEXIS W/NEU-THERA 8.0  2D73TE80

## (undated) DEVICE — SU VICRYL 2-0 SH 27" J317H

## (undated) DEVICE — ADAPTER DRAPE ALLY AU-AD

## (undated) DEVICE — TUBING SUCTION MEDI-VAC SOFT 3/16"X20' N520A

## (undated) DEVICE — NDL 21GA 1.5"

## (undated) DEVICE — SUCTION CANISTER MEDIVAC LINER 3000ML W/LID 65651-530

## (undated) DEVICE — WIPES FOLEY CARE SURESTEP PROVON DFC100

## (undated) DEVICE — BLADE CLIPPER 4406

## (undated) DEVICE — Device

## (undated) DEVICE — DAVINCI XI HANDPIECE ESU VESSEL SEALER 8MM EXT 480422

## (undated) DEVICE — SYR 10ML FINGER CONTROL W/O NDL 309695

## (undated) DEVICE — SOL NACL 0.9% INJ 1000ML BAG 2B1324X

## (undated) DEVICE — CATH TRAY FOLEY SURESTEP 16FR W/URINE MTR STATLK LF A303416A

## (undated) DEVICE — SUCTION TIP YANKAUER W/O VENT K86

## (undated) DEVICE — DAVINCI XI OBTURATOR BLADELESS 8MM 470359

## (undated) DEVICE — GOWN IMPERVIOUS SPECIALTY XLG/XLONG 32474

## (undated) DEVICE — DAVINCI XI DRAPE ARM 470015

## (undated) DEVICE — UTERINE MANIPULATOR RUMI TIP SACROCOLPOPEXY DST END SACRO-1C

## (undated) DEVICE — SUCTION TIP YANKAUER MEDI-VAC K82

## (undated) DEVICE — DAVINCI XI SEAL UNIVERSAL 5-8MM 470361

## (undated) DEVICE — PACK DAVINCI GYN SMA15GDFS1

## (undated) DEVICE — LINEN TOWEL PACK X5 5464

## (undated) DEVICE — ESU PENCIL W/HOLSTER E2350H

## (undated) RX ORDER — HYDROMORPHONE HYDROCHLORIDE 1 MG/ML
INJECTION, SOLUTION INTRAMUSCULAR; INTRAVENOUS; SUBCUTANEOUS
Status: DISPENSED
Start: 2021-10-20

## (undated) RX ORDER — KETOROLAC TROMETHAMINE 30 MG/ML
INJECTION, SOLUTION INTRAMUSCULAR; INTRAVENOUS
Status: DISPENSED
Start: 2021-10-20

## (undated) RX ORDER — FENTANYL CITRATE 50 UG/ML
INJECTION, SOLUTION INTRAMUSCULAR; INTRAVENOUS
Status: DISPENSED
Start: 2021-09-28

## (undated) RX ORDER — HYDROMORPHONE HCL IN WATER/PF 6 MG/30 ML
PATIENT CONTROLLED ANALGESIA SYRINGE INTRAVENOUS
Status: DISPENSED
Start: 2021-10-20

## (undated) RX ORDER — NEOSTIGMINE METHYLSULFATE 1 MG/ML
VIAL (ML) INJECTION
Status: DISPENSED
Start: 2021-10-20

## (undated) RX ORDER — DEXAMETHASONE SODIUM PHOSPHATE 4 MG/ML
INJECTION, SOLUTION INTRA-ARTICULAR; INTRALESIONAL; INTRAMUSCULAR; INTRAVENOUS; SOFT TISSUE
Status: DISPENSED
Start: 2021-10-20

## (undated) RX ORDER — DIPHENHYDRAMINE HYDROCHLORIDE 50 MG/ML
INJECTION INTRAMUSCULAR; INTRAVENOUS
Status: DISPENSED
Start: 2021-09-28

## (undated) RX ORDER — BUPIVACAINE HYDROCHLORIDE AND EPINEPHRINE 2.5; 5 MG/ML; UG/ML
INJECTION, SOLUTION EPIDURAL; INFILTRATION; INTRACAUDAL; PERINEURAL
Status: DISPENSED
Start: 2021-10-20

## (undated) RX ORDER — FENTANYL CITRATE 50 UG/ML
INJECTION, SOLUTION INTRAMUSCULAR; INTRAVENOUS
Status: DISPENSED
Start: 2021-10-20

## (undated) RX ORDER — CEFAZOLIN SODIUM 2 G/100ML
INJECTION, SOLUTION INTRAVENOUS
Status: DISPENSED
Start: 2021-10-20

## (undated) RX ORDER — GLYCOPYRROLATE 0.2 MG/ML
INJECTION, SOLUTION INTRAMUSCULAR; INTRAVENOUS
Status: DISPENSED
Start: 2021-10-20

## (undated) RX ORDER — ONDANSETRON 2 MG/ML
INJECTION INTRAMUSCULAR; INTRAVENOUS
Status: DISPENSED
Start: 2021-10-20

## (undated) RX ORDER — SIMETHICONE 40MG/0.6ML
SUSPENSION, DROPS(FINAL DOSAGE FORM)(ML) ORAL
Status: DISPENSED
Start: 2021-09-28

## (undated) RX ORDER — ACETAMINOPHEN 325 MG/1
TABLET ORAL
Status: DISPENSED
Start: 2021-10-20

## (undated) RX ORDER — LIDOCAINE HYDROCHLORIDE 20 MG/ML
INJECTION, SOLUTION EPIDURAL; INFILTRATION; INTRACAUDAL; PERINEURAL
Status: DISPENSED
Start: 2021-10-20

## (undated) RX ORDER — MEPERIDINE HYDROCHLORIDE 25 MG/ML
INJECTION INTRAMUSCULAR; INTRAVENOUS; SUBCUTANEOUS
Status: DISPENSED
Start: 2021-10-20